# Patient Record
Sex: MALE | Race: WHITE
[De-identification: names, ages, dates, MRNs, and addresses within clinical notes are randomized per-mention and may not be internally consistent; named-entity substitution may affect disease eponyms.]

---

## 2018-07-02 ENCOUNTER — HOSPITAL ENCOUNTER (EMERGENCY)
Dept: HOSPITAL 26 - MED | Age: 53
Discharge: ANOTHER HOSPITAL | End: 2018-07-02
Payer: MEDICAID

## 2018-07-02 VITALS
HEIGHT: 68 IN | SYSTOLIC BLOOD PRESSURE: 126 MMHG | DIASTOLIC BLOOD PRESSURE: 89 MMHG | WEIGHT: 180 LBS | BODY MASS INDEX: 27.28 KG/M2

## 2018-07-02 VITALS — DIASTOLIC BLOOD PRESSURE: 76 MMHG | SYSTOLIC BLOOD PRESSURE: 119 MMHG

## 2018-07-02 DIAGNOSIS — T82.41XA: Primary | ICD-10-CM

## 2018-07-02 DIAGNOSIS — N18.6: ICD-10-CM

## 2018-07-02 DIAGNOSIS — Z99.2: ICD-10-CM

## 2018-07-02 LAB
ALBUMIN FLD-MCNC: 3.7 G/DL (ref 3.4–5)
ANION GAP SERPL CALCULATED.3IONS-SCNC: 13.9 MMOL/L (ref 8–16)
APPEARANCE UR: (no result)
AST SERPL-CCNC: 23 U/L (ref 15–37)
BARBITURATES UR QL SCN: (no result) NG/ML
BASOPHILS # BLD AUTO: 0 K/UL (ref 0–0.22)
BASOPHILS NFR BLD AUTO: 0.6 % (ref 0–2)
BENZODIAZ UR QL SCN: (no result) NG/ML
BILIRUB SERPL-MCNC: 0.3 MG/DL (ref 0–1)
BILIRUB UR QL STRIP: NEGATIVE
BUN SERPL-MCNC: 100 MG/DL (ref 7–18)
BZE UR QL SCN: (no result) NG/ML
CANNABINOIDS UR QL SCN: (no result) NG/ML
CHLORIDE SERPL-SCNC: 91 MMOL/L (ref 98–107)
CHOLEST/HDLC SERPL: 3.3 {RATIO} (ref 1–4.5)
CK MB SERPL-MCNC: 2.6 NG/ML (ref 0–3.6)
CO2 SERPL-SCNC: 29.3 MMOL/L (ref 21–32)
COLOR UR: YELLOW
CREAT SERPL-MCNC: 3.6 MG/DL (ref 0.7–1.3)
EOSINOPHIL # BLD AUTO: 1 K/UL (ref 0–0.4)
EOSINOPHIL NFR BLD AUTO: 11.7 % (ref 0–4)
ERYTHROCYTE [DISTWIDTH] IN BLOOD BY AUTOMATED COUNT: 12.7 % (ref 11.6–13.7)
GFR SERPL CREATININE-BSD FRML MDRD: 23 ML/MIN (ref 90–?)
GLUCOSE SERPL-MCNC: 216 MG/DL (ref 74–106)
GLUCOSE UR STRIP-MCNC: NEGATIVE MG/DL
HCT VFR BLD AUTO: 32.9 % (ref 36–52)
HDLC SERPL-MCNC: 35 MG/DL (ref 40–60)
HGB BLD-MCNC: 11.5 G/DL (ref 12–18)
HGB UR QL STRIP: (no result)
LDLC SERPL CALC-MCNC: 64 MG/DL (ref 60–100)
LEUKOCYTE ESTERASE UR QL STRIP: (no result)
LYMPHOCYTES # BLD AUTO: 2.4 K/UL (ref 2–11.5)
LYMPHOCYTES NFR BLD AUTO: 27.4 % (ref 20.5–51.1)
MAGNESIUM SERPL-MCNC: 2.5 MG/DL (ref 1.8–2.4)
MCH RBC QN AUTO: 29 PG (ref 27–31)
MCHC RBC AUTO-ENTMCNC: 35 G/DL (ref 33–37)
MCV RBC AUTO: 84.1 FL (ref 80–94)
MONOCYTES # BLD AUTO: 0.7 K/UL (ref 0.8–1)
MONOCYTES NFR BLD AUTO: 7.4 % (ref 1.7–9.3)
NEUTROPHILS # BLD AUTO: 4.7 K/UL (ref 1.8–7.7)
NEUTROPHILS NFR BLD AUTO: 52.9 % (ref 42.2–75.2)
NITRITE UR QL STRIP: NEGATIVE
OPIATES UR QL SCN: (no result) NG/ML
PCP UR QL SCN: (no result) NG/ML
PH UR STRIP: 5.5 [PH] (ref 5–9)
PHOSPHATE SERPL-MCNC: 6.3 MG/DL (ref 2.5–4.9)
PLATELET # BLD AUTO: 128 K/UL (ref 140–450)
POTASSIUM SERPL-SCNC: 3.2 MMOL/L (ref 3.5–5.1)
PROTHROMBIN TIME: 10.4 SECS (ref 10.8–13.4)
RBC # BLD AUTO: 3.91 MIL/UL (ref 4.2–6.1)
RBC #/AREA URNS HPF: (no result) /HPF (ref 0–5)
SODIUM SERPL-SCNC: 131 MMOL/L (ref 136–145)
TRIGL SERPL-MCNC: 80 MG/DL (ref 30–150)
TSH SERPL DL<=0.05 MIU/L-ACNC: 1.62 UIU/ML (ref 0.34–3.74)
WBC # BLD AUTO: 8.9 K/UL (ref 4.8–10.8)
WBC,URINE: (no result) /HPF (ref 0–5)

## 2018-07-02 PROCEDURE — 80061 LIPID PANEL: CPT

## 2018-07-02 PROCEDURE — 93005 ELECTROCARDIOGRAM TRACING: CPT

## 2018-07-02 PROCEDURE — 81001 URINALYSIS AUTO W/SCOPE: CPT

## 2018-07-02 PROCEDURE — 84436 ASSAY OF TOTAL THYROXINE: CPT

## 2018-07-02 PROCEDURE — 83880 ASSAY OF NATRIURETIC PEPTIDE: CPT

## 2018-07-02 PROCEDURE — 85025 COMPLETE CBC W/AUTO DIFF WBC: CPT

## 2018-07-02 PROCEDURE — 85610 PROTHROMBIN TIME: CPT

## 2018-07-02 PROCEDURE — 80053 COMPREHEN METABOLIC PANEL: CPT

## 2018-07-02 PROCEDURE — 71045 X-RAY EXAM CHEST 1 VIEW: CPT

## 2018-07-02 PROCEDURE — 84479 ASSAY OF THYROID (T3 OR T4): CPT

## 2018-07-02 PROCEDURE — 84443 ASSAY THYROID STIM HORMONE: CPT

## 2018-07-02 PROCEDURE — 87086 URINE CULTURE/COLONY COUNT: CPT

## 2018-07-02 PROCEDURE — 83735 ASSAY OF MAGNESIUM: CPT

## 2018-07-02 PROCEDURE — 36415 COLL VENOUS BLD VENIPUNCTURE: CPT

## 2018-07-02 PROCEDURE — 84484 ASSAY OF TROPONIN QUANT: CPT

## 2018-07-02 PROCEDURE — 99285 EMERGENCY DEPT VISIT HI MDM: CPT

## 2018-07-02 PROCEDURE — 85730 THROMBOPLASTIN TIME PARTIAL: CPT

## 2018-07-02 PROCEDURE — 82553 CREATINE MB FRACTION: CPT

## 2018-07-02 PROCEDURE — 80305 DRUG TEST PRSMV DIR OPT OBS: CPT

## 2018-07-02 PROCEDURE — 84100 ASSAY OF PHOSPHORUS: CPT

## 2018-07-02 PROCEDURE — 84134 ASSAY OF PREALBUMIN: CPT

## 2018-07-02 PROCEDURE — 83036 HEMOGLOBIN GLYCOSYLATED A1C: CPT

## 2018-07-02 PROCEDURE — 82550 ASSAY OF CK (CPK): CPT

## 2018-07-02 NOTE — NUR
PT RESTING COMFORTABLY IN BED, PT AOX2, FORGETFUL AT TIMES, BEDREST AT THIS 
TIME. PT BIBA FOR DISLODGED L UPPER CHEST DIALYSIS CATH. PT HAS GTUBE, 
COLOSTOMY, SUPRAPUBIC CATH. OPEN WOUNDS NOTED ON BUTTOCKS AND PENILE SHAFT. ALL 
NEEDS MET AT THIS TIME.

## 2018-07-02 NOTE — NUR
Patient to be transferred to [facility].  Is being transferred due 
to DISLODGED LEANN CATH.  Receiving facility has accepting physician and 
available space. ER physician has signed transfer form.  Patient or responsible 
party has agreed to transfer and signed form.  Patient belongings inventoried 
and will be sent with patient.  Copy of nursing notes, lab reports, EKG, 
Physicians Orders and X-rays to be sent with patient.  Report called to PATRICIA, 
RN at receiving facility. [facility] ambulance service has been called for transfer.  
ETA is 1 HR.

## 2018-07-03 LAB
T3RU NFR SERPL: 30 % (ref 24–39)
T4 SERPL-MCNC: 9.6 UG/DL (ref 4.5–12)

## 2018-07-03 NOTE — NUR
PATIENT TRANSFERED TO [facility] URINE CULTURE RETURNED POSITIVE FOR 
YEAST NO SUSCEPTABILITY REPORT AT THIS TIME.

## 2018-07-28 ENCOUNTER — HOSPITAL ENCOUNTER (INPATIENT)
Dept: HOSPITAL 26 - MED | Age: 53
LOS: 1 days | Discharge: NURSING, REHAB OR LONG-TERM CARE | DRG: 203 | End: 2018-07-29
Attending: INTERNAL MEDICINE | Admitting: INTERNAL MEDICINE
Payer: MEDICAID

## 2018-07-28 VITALS — SYSTOLIC BLOOD PRESSURE: 116 MMHG | DIASTOLIC BLOOD PRESSURE: 73 MMHG

## 2018-07-28 VITALS — BODY MASS INDEX: 28.77 KG/M2 | HEIGHT: 66 IN | WEIGHT: 179 LBS

## 2018-07-28 VITALS — DIASTOLIC BLOOD PRESSURE: 70 MMHG | SYSTOLIC BLOOD PRESSURE: 112 MMHG

## 2018-07-28 DIAGNOSIS — E11.22: ICD-10-CM

## 2018-07-28 DIAGNOSIS — Z99.2: ICD-10-CM

## 2018-07-28 DIAGNOSIS — G37.3: ICD-10-CM

## 2018-07-28 DIAGNOSIS — D64.9: ICD-10-CM

## 2018-07-28 DIAGNOSIS — Z93.6: ICD-10-CM

## 2018-07-28 DIAGNOSIS — I12.0: ICD-10-CM

## 2018-07-28 DIAGNOSIS — Z93.3: ICD-10-CM

## 2018-07-28 DIAGNOSIS — Z79.899: ICD-10-CM

## 2018-07-28 DIAGNOSIS — M94.0: Primary | ICD-10-CM

## 2018-07-28 DIAGNOSIS — E78.5: ICD-10-CM

## 2018-07-28 DIAGNOSIS — N18.6: ICD-10-CM

## 2018-07-28 LAB
ALBUMIN FLD-MCNC: 4.6 G/DL (ref 3.4–5)
AMYLASE SERPL-CCNC: 67 U/L (ref 25–115)
ANION GAP SERPL CALCULATED.3IONS-SCNC: 14 MMOL/L (ref 8–16)
AST SERPL-CCNC: 30 U/L (ref 15–37)
BASOPHILS # BLD AUTO: 0.1 K/UL (ref 0–0.22)
BASOPHILS NFR BLD AUTO: 0.7 % (ref 0–2)
BILIRUB SERPL-MCNC: 0.6 MG/DL (ref 0–1)
BUN SERPL-MCNC: 7 MG/DL (ref 7–18)
CHLORIDE SERPL-SCNC: 99 MMOL/L (ref 98–107)
CO2 SERPL-SCNC: 27.1 MMOL/L (ref 21–32)
CREAT SERPL-MCNC: 1.8 MG/DL (ref 0.7–1.3)
EOSINOPHIL # BLD AUTO: 1 K/UL (ref 0–0.4)
EOSINOPHIL NFR BLD AUTO: 13.3 % (ref 0–4)
ERYTHROCYTE [DISTWIDTH] IN BLOOD BY AUTOMATED COUNT: 12.4 % (ref 11.6–13.7)
GFR SERPL CREATININE-BSD FRML MDRD: 51 ML/MIN (ref 90–?)
GLUCOSE SERPL-MCNC: 135 MG/DL (ref 74–106)
HCT VFR BLD AUTO: 34.2 % (ref 36–52)
HGB BLD-MCNC: 11.1 G/DL (ref 12–18)
LIPASE SERPL-CCNC: 132 U/L (ref 73–393)
LYMPHOCYTES # BLD AUTO: 1.5 K/UL (ref 2–11.5)
LYMPHOCYTES NFR BLD AUTO: 19 % (ref 20.5–51.1)
MCH RBC QN AUTO: 28 PG (ref 27–31)
MCHC RBC AUTO-ENTMCNC: 32 G/DL (ref 33–37)
MCV RBC AUTO: 86.1 FL (ref 80–94)
MONOCYTES # BLD AUTO: 0.5 K/UL (ref 0.8–1)
MONOCYTES NFR BLD AUTO: 5.8 % (ref 1.7–9.3)
NEUTROPHILS # BLD AUTO: 4.8 K/UL (ref 1.8–7.7)
NEUTROPHILS NFR BLD AUTO: 61.2 % (ref 42.2–75.2)
PLATELET # BLD AUTO: 150 K/UL (ref 140–450)
POTASSIUM SERPL-SCNC: 3.1 MMOL/L (ref 3.5–5.1)
PROTHROMBIN TIME: 9.2 SECS (ref 10.8–13.4)
RBC # BLD AUTO: 3.97 MIL/UL (ref 4.2–6.1)
SODIUM SERPL-SCNC: 137 MMOL/L (ref 136–145)
WBC # BLD AUTO: 7.9 K/UL (ref 4.8–10.8)

## 2018-07-28 NOTE — NUR
RECEIVED REPORT FROM DAY SHIFT RN FOR CONTINUITY OF CARE. PT IS A/OX1, ON ROOM AIR, Tajik 
SPEAKING ONLY. PT IS ABLE TO MAKE NEEDS KNOWN, ABLE TO FOLLOW COMMANDS. RESPIRATIONS EVEN 
AND UNLABORED AT THE MOMENT. PT HAS DRY AND REDDENED SKIN ON BUTTOCKS/SACRAL AREA, BUT SKIN 
IS INTACT. PT HAS 24G IV TO RIGHT HAND, ASYMPTOMATIC, INTACT AND PATENT. PT HAS A COLOSTOMY 
BAG TO LUQ AND A SUPRAPUBIC CATHETER. OBTAINED MRSA SWAB AND SENT TO LAB. DISCUSSED PLAN OF 
CARE WITH PT, PT VERBALIZED UNDERSTANDING. VITAL SIGNS WITHIN NORMAL LIMITS. PT STABLE, NO 
SIGNS OF DISTRESS NOTED AT THIS TIME. BED IN LOWEST POSITION, BED ALARM ON. CALL LIGHT 
WITHIN REACH, WILL CONTINUE TO MONITOR.

## 2018-07-28 NOTE — NUR
CRITICAL LAB VALUE RECEIVED AT THIS TIME, LACTIC ACID 2.4, ER MD MAGGY ARIAS 
DPRIMARY NURSE NOTIFIED.

## 2018-07-28 NOTE — NUR
Patient will be admitted to care of DR. TANG. Admited to TELE.  Will go to 
rooM 114. Belongings list completed.  Report to POLLY AREVALO.

## 2018-07-28 NOTE — NUR
52 YO M BIBA W/C/O LEFT ARM PAIN RADIATING TO L CHEST. PT AWAKE ALERT AND 
ORIENTED . EMS STATES THAT PT WAS 2 HRS IN TO DIALYSIS, WHEN PAIN STARTED, PT 
WANTED TO STOP DIALYSIS. PT RESEVED 24ASA IN ROUTE. PT DENIES ANY SOB, PT WITH 
N/V ON ARRIVAL. 

INROUTE. ELMER CATH TO R UPPER CHEST, SUPRAPUBIC FOLY, COLOSTOMY. LS CLEAR 
THROUGHOUT, BS ACTIVE. WILL CONTINUE TO MONITOR 



HX OF HTN, DM. DIALYSIS TUS,THU,SAT

## 2018-07-29 VITALS — DIASTOLIC BLOOD PRESSURE: 59 MMHG | SYSTOLIC BLOOD PRESSURE: 91 MMHG

## 2018-07-29 VITALS — DIASTOLIC BLOOD PRESSURE: 71 MMHG | SYSTOLIC BLOOD PRESSURE: 106 MMHG

## 2018-07-29 VITALS — DIASTOLIC BLOOD PRESSURE: 72 MMHG | SYSTOLIC BLOOD PRESSURE: 109 MMHG

## 2018-07-29 VITALS — SYSTOLIC BLOOD PRESSURE: 104 MMHG | DIASTOLIC BLOOD PRESSURE: 69 MMHG

## 2018-07-29 LAB
ALBUMIN FLD-MCNC: 3.7 G/DL (ref 3.4–5)
ANION GAP SERPL CALCULATED.3IONS-SCNC: 13 MMOL/L (ref 8–16)
APPEARANCE UR: (no result)
AST SERPL-CCNC: 22 U/L (ref 15–37)
BASOPHILS # BLD AUTO: 0.1 K/UL (ref 0–0.22)
BASOPHILS NFR BLD AUTO: 0.7 % (ref 0–2)
BILIRUB SERPL-MCNC: 0.4 MG/DL (ref 0–1)
BILIRUB UR QL STRIP: NEGATIVE
BUN SERPL-MCNC: 12 MG/DL (ref 7–18)
CHLORIDE SERPL-SCNC: 100 MMOL/L (ref 98–107)
CK MB SERPL-MCNC: 10.1 NG/ML (ref 0–3.6)
CO2 SERPL-SCNC: 28.1 MMOL/L (ref 21–32)
COLOR UR: YELLOW
CREAT SERPL-MCNC: 2.2 MG/DL (ref 0.7–1.3)
EOSINOPHIL # BLD AUTO: 1.5 K/UL (ref 0–0.4)
EOSINOPHIL NFR BLD AUTO: 20.6 % (ref 0–4)
ERYTHROCYTE [DISTWIDTH] IN BLOOD BY AUTOMATED COUNT: 12.3 % (ref 11.6–13.7)
GFR SERPL CREATININE-BSD FRML MDRD: 41 ML/MIN (ref 90–?)
GLUCOSE SERPL-MCNC: 137 MG/DL (ref 74–106)
GLUCOSE UR STRIP-MCNC: NEGATIVE MG/DL
HCT VFR BLD AUTO: 29 % (ref 36–52)
HGB BLD-MCNC: 9.8 G/DL (ref 12–18)
HGB UR QL STRIP: (no result)
LEUKOCYTE ESTERASE UR QL STRIP: (no result)
LYMPHOCYTES # BLD AUTO: 2.1 K/UL (ref 2–11.5)
LYMPHOCYTES NFR BLD AUTO: 30 % (ref 20.5–51.1)
MAGNESIUM SERPL-MCNC: 1.8 MG/DL (ref 1.8–2.4)
MCH RBC QN AUTO: 29 PG (ref 27–31)
MCHC RBC AUTO-ENTMCNC: 34 G/DL (ref 33–37)
MCV RBC AUTO: 85.3 FL (ref 80–94)
MONOCYTES # BLD AUTO: 0.6 K/UL (ref 0.8–1)
MONOCYTES NFR BLD AUTO: 8.1 % (ref 1.7–9.3)
NEUTROPHILS # BLD AUTO: 2.9 K/UL (ref 1.8–7.7)
NEUTROPHILS NFR BLD AUTO: 40.6 % (ref 42.2–75.2)
NITRITE UR QL STRIP: NEGATIVE
PH UR STRIP: 6.5 [PH] (ref 5–9)
PHOSPHATE SERPL-MCNC: 2.8 MG/DL (ref 2.5–4.9)
PLATELET # BLD AUTO: 127 K/UL (ref 140–450)
POTASSIUM SERPL-SCNC: 3.1 MMOL/L (ref 3.5–5.1)
RBC # BLD AUTO: 3.4 MIL/UL (ref 4.2–6.1)
RBC #/AREA URNS HPF: (no result) /HPF (ref 0–5)
SODIUM SERPL-SCNC: 138 MMOL/L (ref 136–145)
WBC # BLD AUTO: 7.1 K/UL (ref 4.8–10.8)
WBC,URINE: (no result) /HPF (ref 0–5)

## 2018-07-29 NOTE — NUR
PT STATED THAT HE LOST HIS PAIR OF GLASSES AND CELLPHONE. CHECKED PT'S ROOM, FOUND PT'S 
GLASSES UNDERNEATH THE SHEET, PT'S CELL PHONE WAS FOUND IN THE NURSE'S STATION BEING 
CHARGED. GAVE PT HIS GLASSES AND CELLPHONE, INSTRUCTED TO KEEP IT WITHIN HIM. PT VERBALIZED 
UNDERSTANDING.

## 2018-07-29 NOTE — NUR
CALLED [facility] AND GAVE ME THE LOGISTIC TRANSPORTATION [phone]. SPOKE TO [facility] AND 
GAVE RESERVATION# [id number]. JORDY AREVALO MADE AWARE.

## 2018-07-29 NOTE — NUR
PT WHEELED OUT TO THE PARKING LOT AND HELPED TRANSFERRED TO THE TRANSPORT VEHICLE. PT IS D/C 
BACK TO [facility] IN STABLE CONDITION. NO COMPLAINTS MADE, ALL BELONGINGS 
WITH HIM, GLASSES, CELLPHONE AND WHEELCHAIR.

## 2018-07-29 NOTE — NUR
RECEIVED PT ON BED, AAO WITH PERIODS OF CONFUSION, REORIENTATION PROVIDED. NO SOB NOTED. NO 
C/O PAIN AT THIS TIME. IV TO LT HAND PATENT AND INTACT. CHEST CLEAR. ABDOMEN SOFT, BOWEL 
SOUNDS PRESENT, WITH COLOSTOMY TO LT LOWER QUADRANT, NEW BAG APPLIED BY NIGHTSHIFT, NO 
STOOLS NOTED YET. WITH HANSON DRAINING MODERATE AMOUNTS OF CLOUDY, ROSA URINE. INSTRUCTED PT 
TO CALL FOR ASSISTANCE, CALL LIGHT WITHIN REACH, PT VERBALIZED PARTIAL UNDERSTANDING.

## 2018-07-29 NOTE — NUR
REPORT GIVEN TO HELEN AT [facility] WHERE PT CAME FROM ([phone]), PT 
IS GOING BACK TO ROOM 602-BED B. VOICEMAIL MESSAGE LEFT TO PT'S SON HUSAM EDWARDS 
([phone]) REGARDING THE DISCHARGE. DRAINED 380 MLS OF SLIGHTLY CLOUDY URINE FROM 
SUPRAPUBIC CATHETER. NO OUTPUT NOTED ON COLOSTOMY, BAG CLEAN AND INTACT. RT CHEST HD 
DIALYSIS CATHETER IN PLACE, SITE CLEAN AND DRY. DISCHARGE INSTRUCTIONS GIVEN TO PT, 
VERBALIZED UNDERSTANDING. ARM BANDS AND IV REMOVED, CANNULA TIP INTACT.

## 2018-07-31 NOTE — NUR
RETRO   FAXED ER REPORT, H&P AND DISCHARGE INSTRUCTIONS TO [facility] [phone]  AND 
[place] [phone]

## 2018-08-01 ENCOUNTER — HOSPITAL ENCOUNTER (INPATIENT)
Dept: HOSPITAL 26 - MED | Age: 53
LOS: 2 days | Discharge: NURSING, REHAB OR LONG-TERM CARE | DRG: 351 | End: 2018-08-03
Admitting: FAMILY MEDICINE
Payer: MEDICAID

## 2018-08-01 VITALS — BODY MASS INDEX: 25.62 KG/M2 | HEIGHT: 68 IN | WEIGHT: 169.06 LBS

## 2018-08-01 VITALS — SYSTOLIC BLOOD PRESSURE: 111 MMHG | DIASTOLIC BLOOD PRESSURE: 76 MMHG

## 2018-08-01 VITALS — DIASTOLIC BLOOD PRESSURE: 69 MMHG | SYSTOLIC BLOOD PRESSURE: 111 MMHG

## 2018-08-01 VITALS — SYSTOLIC BLOOD PRESSURE: 93 MMHG | DIASTOLIC BLOOD PRESSURE: 62 MMHG

## 2018-08-01 VITALS — SYSTOLIC BLOOD PRESSURE: 113 MMHG | DIASTOLIC BLOOD PRESSURE: 79 MMHG

## 2018-08-01 DIAGNOSIS — E83.52: ICD-10-CM

## 2018-08-01 DIAGNOSIS — Z79.899: ICD-10-CM

## 2018-08-01 DIAGNOSIS — Z93.3: ICD-10-CM

## 2018-08-01 DIAGNOSIS — E11.22: ICD-10-CM

## 2018-08-01 DIAGNOSIS — E11.65: ICD-10-CM

## 2018-08-01 DIAGNOSIS — Z79.4: ICD-10-CM

## 2018-08-01 DIAGNOSIS — M19.019: Primary | ICD-10-CM

## 2018-08-01 DIAGNOSIS — G37.3: ICD-10-CM

## 2018-08-01 DIAGNOSIS — R07.9: ICD-10-CM

## 2018-08-01 DIAGNOSIS — N18.6: ICD-10-CM

## 2018-08-01 DIAGNOSIS — D64.9: ICD-10-CM

## 2018-08-01 DIAGNOSIS — E87.6: ICD-10-CM

## 2018-08-01 DIAGNOSIS — I12.0: ICD-10-CM

## 2018-08-01 DIAGNOSIS — I25.10: ICD-10-CM

## 2018-08-01 DIAGNOSIS — Z99.2: ICD-10-CM

## 2018-08-01 DIAGNOSIS — E78.5: ICD-10-CM

## 2018-08-01 LAB
ALBUMIN FLD-MCNC: 3.9 G/DL (ref 3.4–5)
ANION GAP SERPL CALCULATED.3IONS-SCNC: 11.3 MMOL/L (ref 8–16)
APPEARANCE UR: (no result)
AST SERPL-CCNC: 26 U/L (ref 15–37)
BASOPHILS # BLD AUTO: 0 K/UL (ref 0–0.22)
BASOPHILS NFR BLD AUTO: 0.7 % (ref 0–2)
BILIRUB SERPL-MCNC: 0.5 MG/DL (ref 0–1)
BILIRUB UR QL STRIP: NEGATIVE
BUN SERPL-MCNC: 21 MG/DL (ref 7–18)
CHLORIDE SERPL-SCNC: 100 MMOL/L (ref 98–107)
CO2 SERPL-SCNC: 28.8 MMOL/L (ref 21–32)
COLOR UR: YELLOW
CREAT SERPL-MCNC: 2.9 MG/DL (ref 0.7–1.3)
EOSINOPHIL # BLD AUTO: 1.2 K/UL (ref 0–0.4)
EOSINOPHIL NFR BLD AUTO: 15.6 % (ref 0–4)
ERYTHROCYTE [DISTWIDTH] IN BLOOD BY AUTOMATED COUNT: 12.5 % (ref 11.6–13.7)
GFR SERPL CREATININE-BSD FRML MDRD: 30 ML/MIN (ref 90–?)
GLUCOSE SERPL-MCNC: 169 MG/DL (ref 74–106)
GLUCOSE UR STRIP-MCNC: NEGATIVE MG/DL
HCT VFR BLD AUTO: 31.4 % (ref 36–52)
HGB BLD-MCNC: 10.5 G/DL (ref 12–18)
HGB UR QL STRIP: (no result)
LEUKOCYTE ESTERASE UR QL STRIP: (no result)
LYMPHOCYTES # BLD AUTO: 1.5 K/UL (ref 2–11.5)
LYMPHOCYTES NFR BLD AUTO: 20.5 % (ref 20.5–51.1)
MCH RBC QN AUTO: 28 PG (ref 27–31)
MCHC RBC AUTO-ENTMCNC: 33 G/DL (ref 33–37)
MCV RBC AUTO: 85.3 FL (ref 80–94)
MONOCYTES # BLD AUTO: 0.5 K/UL (ref 0.8–1)
MONOCYTES NFR BLD AUTO: 6.8 % (ref 1.7–9.3)
NEUTROPHILS # BLD AUTO: 4.3 K/UL (ref 1.8–7.7)
NEUTROPHILS NFR BLD AUTO: 56.4 % (ref 42.2–75.2)
NITRITE UR QL STRIP: NEGATIVE
PH UR STRIP: 7 [PH] (ref 5–9)
PLATELET # BLD AUTO: 146 K/UL (ref 140–450)
POTASSIUM SERPL-SCNC: 3.1 MMOL/L (ref 3.5–5.1)
PROTHROMBIN TIME: 9.9 SECS (ref 10.8–13.4)
RBC # BLD AUTO: 3.68 MIL/UL (ref 4.2–6.1)
RBC #/AREA URNS HPF: (no result) /HPF (ref 0–5)
SODIUM SERPL-SCNC: 137 MMOL/L (ref 136–145)
WBC # BLD AUTO: 7.6 K/UL (ref 4.8–10.8)
WBC,URINE: (no result) /HPF (ref 0–5)

## 2018-08-01 NOTE — NUR
PATIENT IS SLEEPING, NO S/S OF DISTRESS NOTED, RESPIRATION EVEN AND UNLABORED, CALL LIGHT 
WITHIN REACH, SAFETY MEASURE ENSURED, WILL CONTINUE TO MONITOR.

## 2018-08-01 NOTE — NUR
PATIENT BIBA WITH COMPLAINTS OF CHEST PAIN X 4 MONTHS. PER [facility] PATIENT HAS A HX 
OF CALLING 911 FOR NON-EMERGENCY MATTER. HX OF MENTAL HEALTH ISSUES. PATIENT 
REPORTS CHEST PAIN X 4 MONTHS 10/10. DENIES N/V/D; SKIN AT RIGHT KNEE APPEARS 
TO BE OPEN WOUND, PICTURES TAKEN; LUNGS CLEAR BL; HR EVEN AND REGULAR; PT 
DENIES ANY FEVER, SOB, OR COUGH AT THIS TIME; VSS; PATIENT POSITIONED FOR 
COMFORT; HOB ELEVATED; BEDRAILS UP X2; BED DOWN. ER MD MADE AWARE OF PT STATUS.

## 2018-08-01 NOTE — NUR
RECEIVED REPORT FROM DAY SHIFT RN, PATIENT RESTING IN BED, NO S/S OF DISTRESS NOTED, 
RESPIRATION EVEN AND UNLABORED, O2SAT 97% VIA NASAL CANNULAR 2L/MIN. DIALYSIS ACCESS NOTED 
TO THE RIGHT UPPER CHEST, CLEAN AND DRY. IV TO THE RIGHT HAND PATENT AND INTACT, SALINE 
LOCK. SUPRAPUBIC CATHETER IN PLACE, DRAINING URINE BY GRAVITY, COLOSTOMY BAG IN PLACE, DRY 
AND CLEAN. CALL LIGHT WITHIN REACH, SAFETY MEASURE ENSURED, WILL CONTINUE TO MONITOR.

## 2018-08-01 NOTE — NUR
RECEIVED PT ON UNIT VIA [employer] PT. PT IS AAOX2, BEDBOUND, PT HAS IV ON HIS LEFT HAND, 
PATENT, INTACT, FLUSHING WELL, PT IS ON O2 2L NC, PT HAS A SUPRAPUBIC CATHETER, COLOSTOMY 
BAG ON LEFT QUADRANT, PT HAS SKIN TEAR ON LEFT SHIN, PT HAS DRYNESS ON HIS BUTTOCKS, NO S/S 
OF RESPIRATORY DISTRESS OR DISCOMFORT NOTED, CALL LIGHT WITHIN REACH, WILL CONTINUE TO 
MONITOR.

## 2018-08-02 VITALS — DIASTOLIC BLOOD PRESSURE: 61 MMHG | SYSTOLIC BLOOD PRESSURE: 117 MMHG

## 2018-08-02 VITALS — DIASTOLIC BLOOD PRESSURE: 60 MMHG | SYSTOLIC BLOOD PRESSURE: 107 MMHG

## 2018-08-02 VITALS — SYSTOLIC BLOOD PRESSURE: 95 MMHG | DIASTOLIC BLOOD PRESSURE: 62 MMHG

## 2018-08-02 VITALS — SYSTOLIC BLOOD PRESSURE: 100 MMHG | DIASTOLIC BLOOD PRESSURE: 70 MMHG

## 2018-08-02 VITALS — SYSTOLIC BLOOD PRESSURE: 128 MMHG | DIASTOLIC BLOOD PRESSURE: 60 MMHG

## 2018-08-02 VITALS — DIASTOLIC BLOOD PRESSURE: 59 MMHG | SYSTOLIC BLOOD PRESSURE: 93 MMHG

## 2018-08-02 LAB
BASOPHILS # BLD AUTO: 0.1 K/UL (ref 0–0.22)
BASOPHILS NFR BLD AUTO: 0.8 % (ref 0–2)
EOSINOPHIL # BLD AUTO: 1.2 K/UL (ref 0–0.4)
EOSINOPHIL NFR BLD AUTO: 17.2 % (ref 0–4)
ERYTHROCYTE [DISTWIDTH] IN BLOOD BY AUTOMATED COUNT: 12.4 % (ref 11.6–13.7)
HCT VFR BLD AUTO: 29.7 % (ref 36–52)
HGB BLD-MCNC: 10 G/DL (ref 12–18)
LYMPHOCYTES # BLD AUTO: 1.9 K/UL (ref 2–11.5)
LYMPHOCYTES NFR BLD AUTO: 27.7 % (ref 20.5–51.1)
MCH RBC QN AUTO: 29 PG (ref 27–31)
MCHC RBC AUTO-ENTMCNC: 34 G/DL (ref 33–37)
MCV RBC AUTO: 84.9 FL (ref 80–94)
MONOCYTES # BLD AUTO: 0.5 K/UL (ref 0.8–1)
MONOCYTES NFR BLD AUTO: 7.9 % (ref 1.7–9.3)
NEUTROPHILS # BLD AUTO: 3.1 K/UL (ref 1.8–7.7)
NEUTROPHILS NFR BLD AUTO: 46.4 % (ref 42.2–75.2)
PLATELET # BLD AUTO: 140 K/UL (ref 140–450)
RBC # BLD AUTO: 3.5 MIL/UL (ref 4.2–6.1)
WBC # BLD AUTO: 6.8 K/UL (ref 4.8–10.8)

## 2018-08-02 PROCEDURE — 5A1D70Z PERFORMANCE OF URINARY FILTRATION, INTERMITTENT, LESS THAN 6 HOURS PER DAY: ICD-10-PCS | Performed by: FAMILY MEDICINE

## 2018-08-02 RX ADMIN — ENOXAPARIN SODIUM SCH MG: 40 INJECTION SUBCUTANEOUS at 08:16

## 2018-08-02 NOTE — NUR
PATIENT IS SLEEPING, NO S/S OF DISTRESS NOTED, RESPIRATION EVEN AND UNLABORED. CALL LIGHT 
WITHIN REACH, SAFETY MEASURE ENSURED, WILL CONTINUE TO MONITOR.

## 2018-08-02 NOTE — NUR
PATIENT STATED," I DON'T LIKE THE COLOR OF MY GOWN." EDUCATED PATIENT THE YELLOW GOWN IS 
PART OF FALL RISK PROTOCOL, BUT PATIENT SAID," I DON'T LIKE IT, I WANT THE BLUE ONE." 
PUTTING BLUE GOWN ON AND PATIENT RESTING IN BED, VITAL SIGNS STABLE. CALL LIGHT WITHIN 
REACH, SAFETY MEASURE ENSURED, WILL CONTINUE TO MONITOR.

## 2018-08-02 NOTE — NUR
PT RESTING IN BED. NO S/S OF ACUTE DISTRESS. PT DENIES PAIN AT THIS TIME. CALL LIGHT WITHIN 
REACH SAFETY MEASURES ENSURED. WILL CONTINUE TO MONITOR.

## 2018-08-02 NOTE — NUR
RECEIVED A PHONE CALL FROM THE FIRE DEPARTMENT STATING THE PATIENT WAS TRYING TO ARRANGE 
TRANSPORTATION. WENT TO PT'S ROOM. PT AAXO2, CONFUSED AND NOT SPEAKING FULL THOUGHTS. PT 
SPEAKING MOSTLY IN Guyanese. SOFÍA FREEMAN  HELPED TO TRANSLATE. PT STATES HE DOESN'T HAVE A 
ROOM, THAT HE HAS DIALYSIS AND NEEDS TRANSPORT. SOFÍA TRIED TO REORIENT PT AND EXPLAIN 
THAT HE WOULD BE GETTING DIALYSIS TODAY IN THE ROOM HE WAS IN. PT UNABLE TO COMPREHEND AT 
THIS TIME. PER SOFAÍ PT IS CONFUSED AND NOT MAKING SENSE WHEN SPEAKING.

## 2018-08-02 NOTE — NUR
PATIENT HAS BEEN SCREENED AND CATEGORIZED AS MODERATE NUTRITION RISK. PATIENT WILL BE SEEN 
WITHIN 3-5 DAYS OF ADMISSION.



8/4/18 -8/6/18



CORINA MARRERO RD

## 2018-08-02 NOTE — NUR
NO CHANGE IN CONDITION, PATIENT IS SLEEPING, NO S/S OF DISTRESS NOTED, CALL LIGHT WITHIN 
REACH, SAFETY MEASURE ENSURED, WILL CONTINUE TO MONITOR.

## 2018-08-02 NOTE — NUR
REPOSITIONED PATIENT TO THE RIGHT, BUT PATIENT ASKED ME TO REMOVE ALL THE PILLOWS, AND 
SAID," I DON'T WANT PILLOWS NOW, TAKE THOSE PILLOWS OUT." EDUCATED PATIENT THE IMPORTANCE OF 
CHANGING POSITION, BUT PATIENT STILL REFUSED TO BE POSITIONED ON HIS LEFT SIDE OR RIGHT 
SIDE. PATIENT ALSO ASKED ME WHERE HIS NURSE WAS, INFORMED THE PATIENT THAT I AM HIS NURSE, 
BUT PATIENT BECAME UPSET AND SAID," YOU WERE NOT MY NURSE." EXPLAINED TO THE PATIENT I 
HELPED HIM WITH THE CUPS AND TABLE, AND PUT BLUE GOWN FOR HIM EARLIER, PATIENT SAID," OKAY." 
CALL LIGHT WITHIN REACH, SAFETY MEASURE ENSURED, WILL CONTINUE TO MONITOR.

## 2018-08-02 NOTE — NUR
RECEIVED REPORT FROM NIGHT RN AT BEDSIDE. PT SLEEPING IN BED. AAOX2. NO S/S OF ACUTE 
DISTRESS. PT DENIES PAIN. IV SITE PATENT AND INTACT. PLAN OF CARE DISCUSSED. CALL LIGHT 
WITHIN REACH. SAFETY MEASURES ENSURED. WILL CONTINUE TO MONITOR.

## 2018-08-02 NOTE — NUR
DIALYSIS RN AT BEDSIDE. NO S/S OF ACUTE DISTRESS. PT DENIES PAIN. CALL LIGHT WITHIN REACH. 
SAFETY MEASURES ENSURED. WILL CONTINUE TO MONITOR.

## 2018-08-02 NOTE — NUR
PENDING 15MINS IN THE ROOM MOVING CUPS AND TABLE TO SPECIFIC AREA AS PATIENT REQUESTED. 
PATIENT RESTING IN BED, NO S/S OF DISTRESS NOTED, CALL LIGHT WITHIN REACH, SAFETY MEASURE 
ENSURED, WILL CONTINUE TO MONITOR.

## 2018-08-02 NOTE — NUR
RECEIVED REPORT FROM DAY SHIFT RN, PATIENT WAS SLEEPING, BUT EASY TO AROUSE, ORIENTED X2, NO 
S/S OF DISTRESS NOTED, RESPIRATION EVEN AND UNLABORED, O2SAT 97% ON ROOM AIR. DIALYSIS 
ACCESS NOTED TO THE RIGHT UPPER CHEST, CLEAN AND DRY. IV TO THE RIGHT HAND PATENT AND 
INTACT, SALINE LOCK. SUPRAPUBIC CATHETER IN PLACE, DRAINING URINE BY GRAVITY, COLOSTOMY BAG 
IN PLACE, DRY AND CLEAN. CALL LIGHT WITHIN REACH, SAFETY MEASURE ENSURED, WILL CONTINUE TO 
MONITOR.

## 2018-08-03 VITALS — SYSTOLIC BLOOD PRESSURE: 111 MMHG | DIASTOLIC BLOOD PRESSURE: 72 MMHG

## 2018-08-03 VITALS — DIASTOLIC BLOOD PRESSURE: 71 MMHG | SYSTOLIC BLOOD PRESSURE: 100 MMHG

## 2018-08-03 VITALS — SYSTOLIC BLOOD PRESSURE: 106 MMHG | DIASTOLIC BLOOD PRESSURE: 63 MMHG

## 2018-08-03 VITALS — SYSTOLIC BLOOD PRESSURE: 111 MMHG | DIASTOLIC BLOOD PRESSURE: 73 MMHG

## 2018-08-03 RX ADMIN — ENOXAPARIN SODIUM SCH MG: 40 INJECTION SUBCUTANEOUS at 08:23

## 2018-08-03 NOTE — NUR
RECEIVED PT FROM NIGHT SHIFT NURSE, PT IS AAOX3, PT IS SLEEPING IN BED BUT EASILY AROUSED, 
PT IS IN SEMI FOWLERS POSITION, PT HAS IV ON HIS LEFT HAND, PATENT, INTACT, FLUSHING WELL, 
PT HAS LEFT SHIN SKIN TEAR, SACRAL DRYNESS, PT HAS HEMODIALYSIS ACCESS ON RIGHT UPPER CHEST, 
NO S/S OF RESPIRATORY DISTRESS OR DISCOMFORT NOTED, DISCUSSED PLAN OF CARE WITH PT, PT 
REINFORCEMENT NEEDED, SAFETY/FALL PRECAUTIONS ARE IN PLACE, CALL LIGHT IS WITHIN REACH, WILL 
CONTINUE TO MONITOR.

## 2018-08-03 NOTE — NUR
CALLED [facility] [phone] AND SET UP WC TRANSPORT.   I CALLED BACK AND SPOKE WITH 
KAIDEN.   HE SAID PICKUP WILL BE AT 4P.M.     RESERVATION #[id number].

## 2018-08-03 NOTE — NUR
CALLED SCOT AREVALO    THE PATIENT WILL GO TO ROOM 602B UNDER DR. TROTTER AT [facility]. 
  THE PICKUP WILL BE SukhjinderPBRAULIO ALEGRIA RN AWARE.

## 2018-08-03 NOTE — NUR
CALLED [facility], SINCE PATIENT IF OUT OF AREA, REVIEWS GO TO [facility].

I CALLED [facility] AND SPOKE WITH ALONG, [phone].   SHE SAID TO FAX REVIEWS TO [facility] [phone].  

FAXED 8/2/18 AND [phone] REVIEWS WITH ER REPORT AND H&P TO [facility].

## 2018-08-03 NOTE — NUR
Note:



[facility] Per Shalonda from [facility] [phone], patient can go to room 602B after 3pm today, accepting physician is 
,  Phoebe gifford.

## 2018-08-03 NOTE — NUR
VITAL SIGNS STABLE, NO S/S OF DISTRESS NOTED, RESPIRATION EVEN AND UNLABORED, REFUSED TO BE 
POSITIONED ON HIS SIDE, EDUCATED PATIENT THE IMPORTANCE OF CHANGING POSITION, BUT PATIENT 
STATED," NO, I AM OKAY NOW." CALL LIGHT WITHIN REACH, SAFETY MEASURE ENSURED, WILL CONTINUE 
TO MONITOR.

## 2018-08-03 NOTE — NUR
PT DISCHARGE INSTRUCTIONS GIVEN, IV REMOVED, CATHETER TIP INTACT, ID WRIST BAND REMOVED. PT 
STABLE UPON DISCHARGE, PICKED UP BY [facility].

## 2018-08-03 NOTE — NUR
PATIENT WAS SLEEPING, EASY TO AROUSE, NO S/S OF DISTRESS NOTED, CALL LIGHT WITHIN REACH, 
SAFETY MEASURE ENSURED, WILL CONTINUE TO MONITOR.

## 2018-08-03 NOTE — NUR
VITAL SIGNS STABLE, NO S/S OF DISTRESS NOTED, RESPIRATION EVEN AND UNLABORED, CALL LIGHT 
WITHIN REACH, SAFETY MEASURE ENSURED, WILL CONTINUE TO MONITOR.

## 2018-08-03 NOTE — NUR
PATIENT WAS SLEEPING, EASY TO AROUSE, REFUSED TO BE REPOSITIONED, CALL LIGHT WITHIN REACH, 
SAFETY MEASURE ENSURED, WILL CONTINUE TO MONITOR.

## 2018-09-20 ENCOUNTER — HOSPITAL ENCOUNTER (EMERGENCY)
Dept: HOSPITAL 26 - MED | Age: 53
LOS: 1 days | Discharge: HOME | End: 2018-09-21
Payer: MEDICAID

## 2018-09-20 VITALS — DIASTOLIC BLOOD PRESSURE: 66 MMHG | SYSTOLIC BLOOD PRESSURE: 103 MMHG

## 2018-09-20 VITALS — WEIGHT: 180 LBS | HEIGHT: 66 IN | BODY MASS INDEX: 28.93 KG/M2

## 2018-09-20 DIAGNOSIS — Z79.4: ICD-10-CM

## 2018-09-20 DIAGNOSIS — N18.6: ICD-10-CM

## 2018-09-20 DIAGNOSIS — I82.B12: Primary | ICD-10-CM

## 2018-09-20 DIAGNOSIS — Z99.2: ICD-10-CM

## 2018-09-20 DIAGNOSIS — E11.22: ICD-10-CM

## 2018-09-20 DIAGNOSIS — Z79.899: ICD-10-CM

## 2018-09-20 DIAGNOSIS — I12.0: ICD-10-CM

## 2018-09-20 LAB
ALBUMIN FLD-MCNC: 3.2 G/DL (ref 3.4–5)
ANION GAP SERPL CALCULATED.3IONS-SCNC: 7.7 MMOL/L (ref 8–16)
AST SERPL-CCNC: 39 U/L (ref 15–37)
BASOPHILS NFR BLD AUTO: 1.3 % (ref 0–2)
BILIRUB SERPL-MCNC: 0.5 MG/DL (ref 0–1)
BUN SERPL-MCNC: 6 MG/DL (ref 7–18)
CHLORIDE SERPL-SCNC: 102 MMOL/L (ref 98–107)
CO2 SERPL-SCNC: 32.1 MMOL/L (ref 21–32)
CREAT SERPL-MCNC: 1.7 MG/DL (ref 0.7–1.3)
EOSINOPHIL NFR BLD AUTO: 15.9 % (ref 0–4)
ERYTHROCYTE [DISTWIDTH] IN BLOOD BY AUTOMATED COUNT: 15 % (ref 11.6–13.7)
GFR SERPL CREATININE-BSD FRML MDRD: 55 ML/MIN (ref 90–?)
GLUCOSE SERPL-MCNC: 98 MG/DL (ref 74–106)
HCT VFR BLD AUTO: 39.5 % (ref 36–52)
HGB BLD-MCNC: 12.7 G/DL (ref 12–18)
LYMPHOCYTES NFR BLD AUTO: 27.7 % (ref 20.5–51.1)
MCH RBC QN AUTO: 29 PG (ref 27–31)
MCHC RBC AUTO-ENTMCNC: 32 G/DL (ref 33–37)
MCV RBC AUTO: 88.9 FL (ref 80–94)
MONOCYTES NFR BLD AUTO: 8 % (ref 1.7–9.3)
NEUTROPHILS NFR BLD AUTO: 47.1 % (ref 42.2–75.2)
PLATELET # BLD AUTO: 139 K/UL (ref 140–450)
POTASSIUM SERPL-SCNC: 2.8 MMOL/L (ref 3.5–5.1)
PROTHROMBIN TIME: 9.9 SECS (ref 10.8–13.4)
RBC # BLD AUTO: 4.44 MIL/UL (ref 4.2–6.1)
SODIUM SERPL-SCNC: 139 MMOL/L (ref 136–145)
WBC # BLD AUTO: 5.3 K/UL (ref 4.8–10.8)

## 2018-09-20 PROCEDURE — 93971 EXTREMITY STUDY: CPT

## 2018-09-20 PROCEDURE — 99285 EMERGENCY DEPT VISIT HI MDM: CPT

## 2018-09-20 PROCEDURE — 80053 COMPREHEN METABOLIC PANEL: CPT

## 2018-09-20 PROCEDURE — 36415 COLL VENOUS BLD VENIPUNCTURE: CPT

## 2018-09-20 PROCEDURE — 85025 COMPLETE CBC W/AUTO DIFF WBC: CPT

## 2018-09-20 PROCEDURE — 71045 X-RAY EXAM CHEST 1 VIEW: CPT

## 2018-09-20 PROCEDURE — 85730 THROMBOPLASTIN TIME PARTIAL: CPT

## 2018-09-20 PROCEDURE — 93005 ELECTROCARDIOGRAM TRACING: CPT

## 2018-09-20 PROCEDURE — 83880 ASSAY OF NATRIURETIC PEPTIDE: CPT

## 2018-09-20 PROCEDURE — 84484 ASSAY OF TROPONIN QUANT: CPT

## 2018-09-20 PROCEDURE — 85610 PROTHROMBIN TIME: CPT

## 2018-09-20 NOTE — NUR
PT. BIB AMBULANCE FROM [facility] DUE TO ABNORMAL ULTRASOUND. ACCORDING TO 
REPORT FROM FACILITY " PT. HAS BLOOD CLOT ON L SUBCLAVIAN VEIN". PT IS AAOX3 , 
R DIALYSIS SHUNT NOTED TO R SIDE OF CHEST. RR EVEN AND UNLABORED. HANSON CATH 
PRESENT UPON ARRIVAL FROM FACILITY. COLOSTOMY BAG PRESENT WITH GREEN STOOL 
NOTED. PT HAS L KNEE BRUISE. PT. HAS 7/10 ACHING PAIN ALL OVER ONGOING SINCE 
"STROKE A YEAR AGO " PER PATIENT. DENIES N/V/D. L ARM SWELLING 2+ NOTED , NON 
PITTING. ER MD NOTIFIED. VSS. BED IN LOWEST POSITION. WILL CONTINUE TO MONITOR.

## 2018-09-20 NOTE — NUR
-------------------------------------------------------------------------------

            *** Note blayneone in EDM - 09/20/18 at 2332 by MED ***             

-------------------------------------------------------------------------------

Patient discharged with v/s stable. Written and verbal after care instructions 
given and explained. 

Patient alert, oriented and verbalized understanding of instructions. 
Ambulatory with steady gait. All questions addressed prior to discharge. ID 
band removed. Patient advised to follow up with PMD. Rx of Motrin and Zofran 
given. Patient educated on indication of medication including possible reaction 
and side effects. Opportunity to ask questions provided and answered.

## 2018-09-20 NOTE — NUR
-------------------------------------------------------------------------------

            *** Note undone in [facility] - 09/20/18 at 1806 by MEDHT ***             

-------------------------------------------------------------------------------

PT BIBA BLS TO BED 8

## 2018-09-21 VITALS — DIASTOLIC BLOOD PRESSURE: 75 MMHG | SYSTOLIC BLOOD PRESSURE: 119 MMHG

## 2018-09-22 ENCOUNTER — HOSPITAL ENCOUNTER (INPATIENT)
Dept: HOSPITAL 1 - ED | Age: 53
LOS: 2 days | Discharge: NURSING, REHAB OR LONG-TERM CARE | DRG: 197 | End: 2018-09-24
Attending: INTERNAL MEDICINE | Admitting: INTERNAL MEDICINE
Payer: MEDICAID

## 2018-09-22 VITALS
BODY MASS INDEX: 25.92 KG/M2 | WEIGHT: 171.06 LBS | HEIGHT: 68 IN | BODY MASS INDEX: 25.92 KG/M2 | HEIGHT: 68 IN | WEIGHT: 171.06 LBS

## 2018-09-22 VITALS — SYSTOLIC BLOOD PRESSURE: 110 MMHG | DIASTOLIC BLOOD PRESSURE: 65 MMHG

## 2018-09-22 VITALS — SYSTOLIC BLOOD PRESSURE: 89 MMHG | DIASTOLIC BLOOD PRESSURE: 57 MMHG

## 2018-09-22 VITALS — SYSTOLIC BLOOD PRESSURE: 96 MMHG | DIASTOLIC BLOOD PRESSURE: 57 MMHG

## 2018-09-22 DIAGNOSIS — E11.22: ICD-10-CM

## 2018-09-22 DIAGNOSIS — Z91.15: ICD-10-CM

## 2018-09-22 DIAGNOSIS — N18.6: ICD-10-CM

## 2018-09-22 DIAGNOSIS — Z99.2: ICD-10-CM

## 2018-09-22 DIAGNOSIS — I82.B12: Primary | ICD-10-CM

## 2018-09-22 DIAGNOSIS — G04.91: ICD-10-CM

## 2018-09-22 DIAGNOSIS — E44.1: ICD-10-CM

## 2018-09-22 DIAGNOSIS — D63.1: ICD-10-CM

## 2018-09-22 DIAGNOSIS — Z93.3: ICD-10-CM

## 2018-09-22 DIAGNOSIS — N39.0: ICD-10-CM

## 2018-09-22 DIAGNOSIS — I12.0: ICD-10-CM

## 2018-09-22 DIAGNOSIS — F41.9: ICD-10-CM

## 2018-09-22 LAB
ALBUMIN SERPL-MCNC: 3.1 G/DL (ref 3.4–5)
ALP SERPL-CCNC: 85 U/L (ref 46–116)
ALT SERPL-CCNC: 30 U/L (ref 16–63)
AST SERPL-CCNC: 42 U/L (ref 15–37)
BASOPHILS NFR BLD: 1 % (ref 0–2)
BILIRUB SERPL-MCNC: 0.6 MG/DL (ref 0.2–1)
BUN SERPL-MCNC: 11 MG/DL (ref 7–18)
CALCIUM SERPL-MCNC: 9.4 MG/DL (ref 8.5–10.1)
CHLORIDE SERPL-SCNC: 101 MMOL/L (ref 98–107)
CO2 SERPL-SCNC: 31.3 MMOL/L (ref 21–32)
CREAT SERPL-MCNC: 2.5 MG/DL (ref 0.7–1.3)
ERYTHROCYTE [DISTWIDTH] IN BLOOD BY AUTOMATED COUNT: 16.2 % (ref 11.5–14.5)
GFR SERPLBLD BASED ON 1.73 SQ M-ARVRAT: 29 ML/MIN
GLUCOSE SERPL-MCNC: 138 MG/DL (ref 74–106)
PLATELET # BLD: 140 X10^3MCL (ref 130–400)
POTASSIUM SERPL-SCNC: 3.6 MMOL/L (ref 3.5–5.1)
PROT SERPL-MCNC: 7.1 G/DL (ref 6.4–8.2)
SODIUM SERPL-SCNC: 138 MMOL/L (ref 136–145)

## 2018-09-22 PROCEDURE — A4371 SKIN BARRIER POWDER PER OZ: HCPCS

## 2018-09-23 VITALS — DIASTOLIC BLOOD PRESSURE: 67 MMHG | SYSTOLIC BLOOD PRESSURE: 106 MMHG

## 2018-09-23 VITALS — SYSTOLIC BLOOD PRESSURE: 115 MMHG | DIASTOLIC BLOOD PRESSURE: 68 MMHG

## 2018-09-23 VITALS — DIASTOLIC BLOOD PRESSURE: 56 MMHG | SYSTOLIC BLOOD PRESSURE: 93 MMHG

## 2018-09-23 VITALS — DIASTOLIC BLOOD PRESSURE: 67 MMHG | SYSTOLIC BLOOD PRESSURE: 100 MMHG

## 2018-09-23 LAB
BASOPHILS NFR BLD: 1.1 % (ref 0–2)
BUN SERPL-MCNC: 9 MG/DL (ref 7–18)
CALCIUM SERPL-MCNC: 9.2 MG/DL (ref 8.5–10.1)
CHLORIDE SERPL-SCNC: 100 MMOL/L (ref 98–107)
CO2 SERPL-SCNC: 33.9 MMOL/L (ref 21–32)
CREAT SERPL-MCNC: 2 MG/DL (ref 0.7–1.3)
ERYTHROCYTE [DISTWIDTH] IN BLOOD BY AUTOMATED COUNT: 15.9 % (ref 11.5–14.5)
GFR SERPLBLD BASED ON 1.73 SQ M-ARVRAT: 37 ML/MIN
GLUCOSE SERPL-MCNC: 142 MG/DL (ref 74–106)
MICROSCOPIC UR-IMP: YES
PLATELET # BLD: 130 X10^3MCL (ref 130–400)
POTASSIUM SERPL-SCNC: 2.8 MMOL/L (ref 3.5–5.1)
RBC # UR STRIP.AUTO: (no result) /UL
SODIUM SERPL-SCNC: 138 MMOL/L (ref 136–145)
UA SPECIFIC GRAVITY: 1.01 (ref 1–1.03)

## 2018-09-24 VITALS — SYSTOLIC BLOOD PRESSURE: 123 MMHG | DIASTOLIC BLOOD PRESSURE: 79 MMHG

## 2018-09-24 VITALS — SYSTOLIC BLOOD PRESSURE: 112 MMHG | DIASTOLIC BLOOD PRESSURE: 78 MMHG

## 2018-09-24 LAB
BASOPHILS NFR BLD: 0 % (ref 0–2)
BUN SERPL-MCNC: 6 MG/DL (ref 7–18)
CALCIUM SERPL-MCNC: 9.2 MG/DL (ref 8.5–10.1)
CHLORIDE SERPL-SCNC: 103 MMOL/L (ref 98–107)
CO2 SERPL-SCNC: 30.5 MMOL/L (ref 21–32)
CREAT SERPL-MCNC: 1.7 MG/DL (ref 0.7–1.3)
ERYTHROCYTE [DISTWIDTH] IN BLOOD BY AUTOMATED COUNT: 16.3 % (ref 11.5–14.5)
GFR SERPLBLD BASED ON 1.73 SQ M-ARVRAT: 45 ML/MIN
GLUCOSE SERPL-MCNC: 95 MG/DL (ref 74–106)
MONOCYTES NFR BLD: 8 % (ref 0–7)
NEUTS BAND NFR BLD: 0 % (ref 0–10)
NEUTS SEG NFR BLD MANUAL: 35 % (ref 37–75)
PLAT MORPH BLD: (no result)
PLATELET # BLD: 126 X10^3MCL (ref 130–400)
POTASSIUM SERPL-SCNC: 3.1 MMOL/L (ref 3.5–5.1)
RBC MORPH BLD: (no result)
SODIUM SERPL-SCNC: 141 MMOL/L (ref 136–145)

## 2019-03-02 ENCOUNTER — HOSPITAL ENCOUNTER (INPATIENT)
Dept: HOSPITAL 26 - MED | Age: 54
LOS: 4 days | Discharge: NURSING, REHAB OR LONG-TERM CARE | DRG: 466 | End: 2019-03-06
Attending: GENERAL PRACTICE | Admitting: GENERAL PRACTICE
Payer: MEDICAID

## 2019-03-02 VITALS — SYSTOLIC BLOOD PRESSURE: 100 MMHG | DIASTOLIC BLOOD PRESSURE: 52 MMHG

## 2019-03-02 VITALS — BODY MASS INDEX: 24.11 KG/M2 | HEIGHT: 66 IN | WEIGHT: 150 LBS

## 2019-03-02 VITALS — DIASTOLIC BLOOD PRESSURE: 70 MMHG | SYSTOLIC BLOOD PRESSURE: 103 MMHG

## 2019-03-02 VITALS — DIASTOLIC BLOOD PRESSURE: 75 MMHG | SYSTOLIC BLOOD PRESSURE: 118 MMHG

## 2019-03-02 DIAGNOSIS — I12.0: ICD-10-CM

## 2019-03-02 DIAGNOSIS — Z99.2: ICD-10-CM

## 2019-03-02 DIAGNOSIS — E11.51: ICD-10-CM

## 2019-03-02 DIAGNOSIS — Z86.718: ICD-10-CM

## 2019-03-02 DIAGNOSIS — G47.00: ICD-10-CM

## 2019-03-02 DIAGNOSIS — L20.9: ICD-10-CM

## 2019-03-02 DIAGNOSIS — F41.9: ICD-10-CM

## 2019-03-02 DIAGNOSIS — E78.5: ICD-10-CM

## 2019-03-02 DIAGNOSIS — D63.8: ICD-10-CM

## 2019-03-02 DIAGNOSIS — T83.018A: Primary | ICD-10-CM

## 2019-03-02 DIAGNOSIS — N17.0: ICD-10-CM

## 2019-03-02 DIAGNOSIS — N18.6: ICD-10-CM

## 2019-03-02 DIAGNOSIS — Z93.3: ICD-10-CM

## 2019-03-02 DIAGNOSIS — Z86.73: ICD-10-CM

## 2019-03-02 DIAGNOSIS — E11.22: ICD-10-CM

## 2019-03-02 DIAGNOSIS — M19.90: ICD-10-CM

## 2019-03-02 DIAGNOSIS — F25.9: ICD-10-CM

## 2019-03-02 DIAGNOSIS — N30.90: ICD-10-CM

## 2019-03-02 LAB
ALBUMIN FLD-MCNC: 3.7 G/DL (ref 3.4–5)
AMYLASE SERPL-CCNC: 88 U/L (ref 25–115)
ANION GAP SERPL CALCULATED.3IONS-SCNC: 12.1 MMOL/L (ref 8–16)
APPEARANCE UR: (no result)
AST SERPL-CCNC: 26 U/L (ref 15–37)
BASOPHILS # BLD AUTO: 0 K/UL (ref 0–0.22)
BASOPHILS NFR BLD AUTO: 0.6 % (ref 0–2)
BILIRUB SERPL-MCNC: 0.4 MG/DL (ref 0–1)
BILIRUB UR QL STRIP: NEGATIVE
BUN SERPL-MCNC: 69 MG/DL (ref 7–18)
CHLORIDE SERPL-SCNC: 102 MMOL/L (ref 98–107)
CHOLEST/HDLC SERPL: 2.2 {RATIO} (ref 1–4.5)
CO2 SERPL-SCNC: 27.9 MMOL/L (ref 21–32)
COLOR UR: (no result)
CREAT SERPL-MCNC: 3.7 MG/DL (ref 0.7–1.3)
EOSINOPHIL # BLD AUTO: 1.2 K/UL (ref 0–0.4)
EOSINOPHIL NFR BLD AUTO: 16.3 % (ref 0–4)
ERYTHROCYTE [DISTWIDTH] IN BLOOD BY AUTOMATED COUNT: 12.9 % (ref 11.6–13.7)
GFR SERPL CREATININE-BSD FRML MDRD: 22 ML/MIN (ref 90–?)
GLUCOSE SERPL-MCNC: 162 MG/DL (ref 74–106)
GLUCOSE UR STRIP-MCNC: NEGATIVE MG/DL
HCT VFR BLD AUTO: 35.1 % (ref 36–52)
HDLC SERPL-MCNC: 37 MG/DL (ref 40–60)
HGB BLD-MCNC: 11.3 G/DL (ref 12–18)
HGB UR QL STRIP: (no result)
IRON SERPL-MCNC: 55 UG/DL (ref 35–150)
LDLC SERPL CALC-MCNC: 36 MG/DL (ref 60–100)
LEUKOCYTE ESTERASE UR QL STRIP: (no result)
LIPASE SERPL-CCNC: 292 U/L (ref 73–393)
LYMPHOCYTES # BLD AUTO: 1.5 K/UL (ref 2–11.5)
LYMPHOCYTES NFR BLD AUTO: 20.6 % (ref 20.5–51.1)
MAGNESIUM SERPL-MCNC: 2.2 MG/DL (ref 1.8–2.4)
MCH RBC QN AUTO: 28 PG (ref 27–31)
MCHC RBC AUTO-ENTMCNC: 32 G/DL (ref 33–37)
MCV RBC AUTO: 86.9 FL (ref 80–94)
MONOCYTES # BLD AUTO: 0.5 K/UL (ref 0.8–1)
MONOCYTES NFR BLD AUTO: 7.3 % (ref 1.7–9.3)
NEUTROPHILS # BLD AUTO: 4.1 K/UL (ref 1.8–7.7)
NEUTROPHILS NFR BLD AUTO: 55.2 % (ref 42.2–75.2)
NITRITE UR QL STRIP: POSITIVE
PH UR STRIP: 8 [PH] (ref 5–9)
PHOSPHATE SERPL-MCNC: 4.8 MG/DL (ref 2.5–4.9)
PLATELET # BLD AUTO: 95 K/UL (ref 140–450)
POTASSIUM SERPL-SCNC: 4 MMOL/L (ref 3.5–5.1)
PROTHROMBIN TIME: 9.9 SECS (ref 10.8–13.4)
RBC # BLD AUTO: 4.04 MIL/UL (ref 4.2–6.1)
RBC #/AREA URNS HPF: (no result) /HPF (ref 0–5)
SODIUM SERPL-SCNC: 138 MMOL/L (ref 136–145)
TIBC SERPL-MCNC: 148 UG/DL (ref 250–450)
TRIGL SERPL-MCNC: 49 MG/DL (ref 30–150)
TSH SERPL DL<=0.05 MIU/L-ACNC: 1.07 UIU/ML (ref 0.34–3.74)
WBC # BLD AUTO: 7.5 K/UL (ref 4.8–10.8)
WBC,URINE: (no result) /HPF (ref 0–5)

## 2019-03-02 PROCEDURE — P9046 ALBUMIN (HUMAN), 25%, 20 ML: HCPCS

## 2019-03-02 PROCEDURE — 0T9B30Z DRAINAGE OF BLADDER WITH DRAINAGE DEVICE, PERCUTANEOUS APPROACH: ICD-10-PCS

## 2019-03-02 PROCEDURE — 0TPBX0Z REMOVAL OF DRAINAGE DEVICE FROM BLADDER, EXTERNAL APPROACH: ICD-10-PCS

## 2019-03-02 PROCEDURE — 5A1D70Z PERFORMANCE OF URINARY FILTRATION, INTERMITTENT, LESS THAN 6 HOURS PER DAY: ICD-10-PCS

## 2019-03-02 RX ADMIN — CALCIUM ACETATE SCH MG: 667 CAPSULE ORAL at 17:00

## 2019-03-02 RX ADMIN — OXYCODONE HYDROCHLORIDE AND ACETAMINOPHEN SCH MG: 500 TABLET ORAL at 20:41

## 2019-03-02 RX ADMIN — SENNOSIDES A AND B SCH MG: 8.6 TABLET, FILM COATED ORAL at 20:41

## 2019-03-02 RX ADMIN — HYDROCODONE BITARTRATE AND ACETAMINOPHEN PRN TAB: 5; 325 TABLET ORAL at 20:42

## 2019-03-02 RX ADMIN — INSULIN LISPRO PRN UNITS: 100 INJECTION, SOLUTION INTRAVENOUS; SUBCUTANEOUS at 20:42

## 2019-03-02 RX ADMIN — INSULIN LISPRO SCH UNITS: 100 INJECTION, SOLUTION INTRAVENOUS; SUBCUTANEOUS at 21:00

## 2019-03-02 RX ADMIN — Medication SCH GM: at 17:00

## 2019-03-02 RX ADMIN — Medication SCH DEV: at 20:41

## 2019-03-02 NOTE — NUR
PATIENT ADMITTED FROM ED. C/O SUPRAPUBIC CATH MALFUNCTION. PATIENT IS ALERT AND ORIENTED TO 
SELF AND PLACE. FOLLOWS COMMANDS, PERIODS OF CONFUSION. PATIENT ASSISTED IN CHANGING OF 
POSITIONED, NO ACUTE DISTRESS NOTED. COLOSTOMY NOTED LUQ, SMALL SOFT STOOL NOTED. PATIENT 
HAS SUPRAPUBIC CATH TO BAG, NO URINE OUTPUT NOTED. DR. CARRANZA CONSULTED. PATIENT HAS 
MULTIPLE WOUNDS, SKIN KEPT CLEAN AND DRY. IV SITE PATENT AND INTACT. PATIENT ORIENTED TO 
HOSPITAL ENVIRONMENT, VERBALIZED UNDERSTANDING.

## 2019-03-02 NOTE — NUR
SENT FROM [facility]. FOR CLOGGED AND LEAKING SUPRA PUBIC CATH. AS PER 
STAFF, NOTED TODAY. HX: ESRD,DIALYSIS T-TH-SAT.,HIGH CHOL.,COLOSTOMY,FATTY 
LIVER,DYSPHAGIA,DM . DENIES N/V/D; SKIN IS PINK/WARM/DRY; HR EVEN AND REGULAR; 
PT DENIES ANY FEVER, CP, SOB, OR COUGH AT THIS TIME; PATIENT STATES PAIN OF 
7/10 AT THIS TIME; VSS; PATIENT POSITIONED FOR COMFORT; HOB ELEVATED; BEDRAILS 
UP X2; BED DOWN. ER MD MADE AWARE OF PT STATUS.

## 2019-03-02 NOTE — NUR
RECEIVED FROM AM RN IN BED WITH HEMODIALYSIS ON GOING. AWAKE AND ALERT. ABLE TO VERBALIZE 
NEEDS WELL IN Grenadian AND ENGLISH. URINE  BAG  NOTED  WITH PALE RED IN COLOR  URINE RT 
PROCEDURE WAS DONE THIS P.M.  FOR REPAIR OF LEAKING SUPRAPUBIC CATHETER. TELEMETRY 
MONITORING. CARE PLANS FOR THE NIGHT DISCUSSED WITH HIM AND CALL LIGHT PLACED BESIDE HIM IN 
BED FOR EASY ACCESS.

## 2019-03-02 NOTE — NUR
PT. SLEEPING AT THIS TIME. HEMODIALYSIS DONE AT 2145 AND TAKEN OUT WAS 1.2 LITER. NO 
COMPLAINTS DONE. ON TELEMETRY MONITORING.

## 2019-03-02 NOTE — NUR
SUPRAPUBIC CATH REPLACED BY DR. KENYON AT BEDSIDE. IRRIGATED AT BEDSIDE BY MD, PURULENT 
DRAINAGE TO CLEAR BLOOD TINGED WITH CLOTS NOTED. PATIENT STARTED ON HD AT BEDSIDE. NO ACUTE 
DISTRESS NOTED.

## 2019-03-02 NOTE — NUR
-------------------------------------------------------------------------------

            *** Note karina in EDM - 03/02/19 at 1431 by CHERYL ***            

-------------------------------------------------------------------------------

attempted to remove suprapubic cath, withdraw 22 cc yellowish fluid from ballon 
port. maximum resistance unable to pull out. md notified. refilled ballon port 
with 10 mls saline, collecting bag changed. pt verbalized no pain.

## 2019-03-03 VITALS — SYSTOLIC BLOOD PRESSURE: 98 MMHG | DIASTOLIC BLOOD PRESSURE: 65 MMHG

## 2019-03-03 VITALS — DIASTOLIC BLOOD PRESSURE: 50 MMHG | SYSTOLIC BLOOD PRESSURE: 103 MMHG

## 2019-03-03 VITALS — DIASTOLIC BLOOD PRESSURE: 50 MMHG | SYSTOLIC BLOOD PRESSURE: 107 MMHG

## 2019-03-03 VITALS — SYSTOLIC BLOOD PRESSURE: 96 MMHG | DIASTOLIC BLOOD PRESSURE: 60 MMHG

## 2019-03-03 VITALS — DIASTOLIC BLOOD PRESSURE: 60 MMHG | SYSTOLIC BLOOD PRESSURE: 104 MMHG

## 2019-03-03 VITALS — SYSTOLIC BLOOD PRESSURE: 98 MMHG | DIASTOLIC BLOOD PRESSURE: 66 MMHG

## 2019-03-03 LAB
ANION GAP SERPL CALCULATED.3IONS-SCNC: 12.3 MMOL/L (ref 8–16)
BASOPHILS # BLD AUTO: 0.1 K/UL (ref 0–0.22)
BASOPHILS NFR BLD AUTO: 1 % (ref 0–2)
BUN SERPL-MCNC: 33 MG/DL (ref 7–18)
CHLORIDE SERPL-SCNC: 104 MMOL/L (ref 98–107)
CO2 SERPL-SCNC: 29.4 MMOL/L (ref 21–32)
CREAT SERPL-MCNC: 2.4 MG/DL (ref 0.7–1.3)
EOSINOPHIL # BLD AUTO: 1 K/UL (ref 0–0.4)
EOSINOPHIL NFR BLD AUTO: 15.6 % (ref 0–4)
ERYTHROCYTE [DISTWIDTH] IN BLOOD BY AUTOMATED COUNT: 12.7 % (ref 11.6–13.7)
GFR SERPL CREATININE-BSD FRML MDRD: 37 ML/MIN (ref 90–?)
GLUCOSE SERPL-MCNC: 108 MG/DL (ref 74–106)
HCT VFR BLD AUTO: 34.4 % (ref 36–52)
HGB BLD-MCNC: 11.2 G/DL (ref 12–18)
LYMPHOCYTES # BLD AUTO: 1.6 K/UL (ref 2–11.5)
LYMPHOCYTES NFR BLD AUTO: 24.3 % (ref 20.5–51.1)
MAGNESIUM SERPL-MCNC: 1.9 MG/DL (ref 1.8–2.4)
MCH RBC QN AUTO: 28 PG (ref 27–31)
MCHC RBC AUTO-ENTMCNC: 33 G/DL (ref 33–37)
MCV RBC AUTO: 86.1 FL (ref 80–94)
MONOCYTES # BLD AUTO: 0.5 K/UL (ref 0.8–1)
MONOCYTES NFR BLD AUTO: 8.1 % (ref 1.7–9.3)
NEUTROPHILS # BLD AUTO: 3.4 K/UL (ref 1.8–7.7)
NEUTROPHILS NFR BLD AUTO: 51 % (ref 42.2–75.2)
PHOSPHATE SERPL-MCNC: 4.1 MG/DL (ref 2.5–4.9)
PLATELET # BLD AUTO: 90 K/UL (ref 140–450)
POTASSIUM SERPL-SCNC: 3.7 MMOL/L (ref 3.5–5.1)
RBC # BLD AUTO: 4 MIL/UL (ref 4.2–6.1)
SODIUM SERPL-SCNC: 142 MMOL/L (ref 136–145)
WBC # BLD AUTO: 6.6 K/UL (ref 4.8–10.8)

## 2019-03-03 RX ADMIN — Medication SCH GM: at 18:26

## 2019-03-03 RX ADMIN — CALCIUM ACETATE SCH MG: 667 CAPSULE ORAL at 09:39

## 2019-03-03 RX ADMIN — INSULIN LISPRO SCH UNITS: 100 INJECTION, SOLUTION INTRAVENOUS; SUBCUTANEOUS at 11:30

## 2019-03-03 RX ADMIN — INSULIN LISPRO PRN UNITS: 100 INJECTION, SOLUTION INTRAVENOUS; SUBCUTANEOUS at 11:56

## 2019-03-03 RX ADMIN — SENNOSIDES A AND B SCH MG: 8.6 TABLET, FILM COATED ORAL at 09:39

## 2019-03-03 RX ADMIN — Medication SCH DEV: at 16:30

## 2019-03-03 RX ADMIN — Medication SCH EACH: at 09:38

## 2019-03-03 RX ADMIN — INSULIN LISPRO SCH UNITS: 100 INJECTION, SOLUTION INTRAVENOUS; SUBCUTANEOUS at 21:00

## 2019-03-03 RX ADMIN — OXYCODONE HYDROCHLORIDE AND ACETAMINOPHEN SCH MG: 500 TABLET ORAL at 09:41

## 2019-03-03 RX ADMIN — HYDROCODONE BITARTRATE AND ACETAMINOPHEN PRN TAB: 5; 325 TABLET ORAL at 09:53

## 2019-03-03 RX ADMIN — Medication SCH DEV: at 21:00

## 2019-03-03 RX ADMIN — CALCIUM ACETATE SCH MG: 667 CAPSULE ORAL at 18:24

## 2019-03-03 RX ADMIN — FERROUS SULFATE TAB 325 MG (65 MG ELEMENTAL FE) SCH MG: 325 (65 FE) TAB at 09:40

## 2019-03-03 RX ADMIN — Medication SCH GM: at 11:51

## 2019-03-03 RX ADMIN — INSULIN LISPRO SCH UNITS: 100 INJECTION, SOLUTION INTRAVENOUS; SUBCUTANEOUS at 16:30

## 2019-03-03 RX ADMIN — Medication SCH DEV: at 06:20

## 2019-03-03 RX ADMIN — OXYCODONE HYDROCHLORIDE AND ACETAMINOPHEN SCH MG: 500 TABLET ORAL at 21:35

## 2019-03-03 RX ADMIN — SENNOSIDES A AND B SCH MG: 8.6 TABLET, FILM COATED ORAL at 21:35

## 2019-03-03 RX ADMIN — CALCIUM ACETATE SCH MG: 667 CAPSULE ORAL at 12:32

## 2019-03-03 RX ADMIN — DOCUSATE SODIUM SCH MG: 100 CAPSULE, LIQUID FILLED ORAL at 09:40

## 2019-03-03 RX ADMIN — INSULIN LISPRO SCH UNITS: 100 INJECTION, SOLUTION INTRAVENOUS; SUBCUTANEOUS at 06:21

## 2019-03-03 RX ADMIN — Medication SCH DEV: at 11:51

## 2019-03-03 RX ADMIN — Medication SCH GM: at 12:32

## 2019-03-03 RX ADMIN — WHITE PETROLATUM SCH EA: 57 PASTE TOPICAL at 09:00

## 2019-03-03 RX ADMIN — Medication SCH TAB: at 09:41

## 2019-03-03 RX ADMIN — ATORVASTATIN CALCIUM SCH MG: 20 TABLET, FILM COATED ORAL at 09:41

## 2019-03-03 RX ADMIN — WHITE PETROLATUM SCH EA: 57 PASTE TOPICAL at 21:36

## 2019-03-03 NOTE — NUR
PT IS AWAKE AND ORAL MEDICATION WAS GIVEN AND PT TOLERATED IT, CATHETER WAS IRRIGATED AGAIN 
WITH 100ML WATER. NO SIGN OF DISTRESS NOTED. WILL MONITOR PT.

## 2019-03-03 NOTE — NUR
PT IS AWAKE AND LYING ON THE BED, VITAL SIGNS TAKEN AND IS WITHIN NORMAL LIMIT. BLOOD 
GLUCOSE CHECKED AND RESULT  AND NO INSULIN COVERAGE NEEDED. WILL MONITOR PT.

## 2019-03-03 NOTE — NUR
PATIENT HAS BEEN SCREENED AND CATEGORIZED AS HIGH NUTRITION RISK. PATIENT WILL BE SEEN 
WITHIN 1-2 DAYS OF ADMISSION.



03/03/19-03/04/19 



REN SANCHEZ MS, RDN

## 2019-03-03 NOTE — NUR
INFORMED RESIDENT MD RE REPETITIVE ORDER OF HUMALOG INSULIN ADMINISTRATION. INFORMED 
RESIDENT THAT THERE IS AN AUTOMATIC HUMALOG INSULIN SLIDING SCALE .  ADMINISTERED INSULIN 
FOLLOWING SLIDING SCALE . "OK"  NO FURTHER ORDERS GIVEN.

## 2019-03-03 NOTE — NUR
RECEIVED PT FROM LIN RN PT Japanese SPEAKER AAOX2 COMPLAINTS OF GENERALIZED WEAKNESS 
COLOSTOMY BAG EMPY PASTE STOOL SUPRAPUBIC CATHHETER IRRIGATED DRAINING  REDISH COLOR URINE 
REPOAITIONED INITIAL ASSESSMENT DONE

## 2019-03-03 NOTE — NUR
AWAKE AT THIS TIME. PT. NEEDS MET. NO COMPLAINTS OF PAIN AT THIS TIME. TELEMETRY MONITORING. 
CNA PERSONAL HYGIENE RENDERED BY CNAS. TURNED TO SIDES Q 2H. PILLOW SUPPORT PROVIDED TO 
PRESSURE AREAS.

## 2019-03-03 NOTE — NUR
RECEIVED PT FROM NIGHT SHIFT NURSE, PT IS AWAKE AND LYING ON THE BED WITH SIDE RAILS UP AND 
ELBERT LIGHT WITHIN REACH, PT HAS AN IV LINE ON THE LEFT HAND G.24 ON SALINE LOCK, INTACT, PT 
DENIES PAIN AND NO SOB NOTED. NO SIGN OF DISTRESS NOTED, WILL CONTINUE TO MONITOR PT.

## 2019-03-03 NOTE — NUR
PT IS AWAKE AND ORAL MEDICATION GIVEN, PT TOLERATED IT AND NO SIGN OF DISTRESS NOTED. WILL 
CONTINUE TO MONITOR PT.

## 2019-03-03 NOTE — NUR
PT IS AWAKE AND ORAL MEDICATIONS WERE GIVEN AND PT TOLERATED IT. NO SIGN OF DISTRESS NOTED 
AND WILL MONITOR PT.

## 2019-03-03 NOTE — NUR
PT IS AWAKE AND VITAL SIGNS TAKEN AND IS WITHIN NORMAL LIMIT, NO SIGN OF DISTRESS NOTED AND 
WILL MONITOR, PT.

## 2019-03-03 NOTE — NUR
BLOOD SUGAR CHECK PER FINGERSTICK DONE 111. NO HUMALOG COVERAGE GIVEN. AWAKE AND ALERT. ABLE 
TO VERBALIZE NEEDS WELL IN Frisian AND ENGLISH.

## 2019-03-04 VITALS — SYSTOLIC BLOOD PRESSURE: 90 MMHG | DIASTOLIC BLOOD PRESSURE: 50 MMHG

## 2019-03-04 VITALS — SYSTOLIC BLOOD PRESSURE: 106 MMHG | DIASTOLIC BLOOD PRESSURE: 65 MMHG

## 2019-03-04 VITALS — DIASTOLIC BLOOD PRESSURE: 64 MMHG | SYSTOLIC BLOOD PRESSURE: 108 MMHG

## 2019-03-04 VITALS — SYSTOLIC BLOOD PRESSURE: 105 MMHG | DIASTOLIC BLOOD PRESSURE: 58 MMHG

## 2019-03-04 VITALS — DIASTOLIC BLOOD PRESSURE: 64 MMHG | SYSTOLIC BLOOD PRESSURE: 113 MMHG

## 2019-03-04 VITALS — SYSTOLIC BLOOD PRESSURE: 115 MMHG | DIASTOLIC BLOOD PRESSURE: 76 MMHG

## 2019-03-04 LAB
ANION GAP SERPL CALCULATED.3IONS-SCNC: 14.3 MMOL/L (ref 8–16)
BASOPHILS # BLD AUTO: 0 K/UL (ref 0–0.22)
BASOPHILS NFR BLD AUTO: 0.5 % (ref 0–2)
BUN SERPL-MCNC: 43 MG/DL (ref 7–18)
CHLORIDE SERPL-SCNC: 100 MMOL/L (ref 98–107)
CO2 SERPL-SCNC: 27.6 MMOL/L (ref 21–32)
CREAT SERPL-MCNC: 3.1 MG/DL (ref 0.7–1.3)
EOSINOPHIL # BLD AUTO: 1.1 K/UL (ref 0–0.4)
EOSINOPHIL NFR BLD AUTO: 13.2 % (ref 0–4)
ERYTHROCYTE [DISTWIDTH] IN BLOOD BY AUTOMATED COUNT: 12.5 % (ref 11.6–13.7)
GFR SERPL CREATININE-BSD FRML MDRD: 27 ML/MIN (ref 90–?)
GLUCOSE SERPL-MCNC: 108 MG/DL (ref 74–106)
HCT VFR BLD AUTO: 32.8 % (ref 36–52)
HGB BLD-MCNC: 11 G/DL (ref 12–18)
LYMPHOCYTES # BLD AUTO: 1.5 K/UL (ref 2–11.5)
LYMPHOCYTES NFR BLD AUTO: 18.1 % (ref 20.5–51.1)
MAGNESIUM SERPL-MCNC: 1.7 MG/DL (ref 1.8–2.4)
MCH RBC QN AUTO: 28 PG (ref 27–31)
MCHC RBC AUTO-ENTMCNC: 34 G/DL (ref 33–37)
MCV RBC AUTO: 84.9 FL (ref 80–94)
MONOCYTES # BLD AUTO: 0.7 K/UL (ref 0.8–1)
MONOCYTES NFR BLD AUTO: 8.1 % (ref 1.7–9.3)
NEUTROPHILS # BLD AUTO: 4.9 K/UL (ref 1.8–7.7)
NEUTROPHILS NFR BLD AUTO: 60.1 % (ref 42.2–75.2)
PHOSPHATE SERPL-MCNC: 4.3 MG/DL (ref 2.5–4.9)
PLATELET # BLD AUTO: 96 K/UL (ref 140–450)
POTASSIUM SERPL-SCNC: 3.9 MMOL/L (ref 3.5–5.1)
RBC # BLD AUTO: 3.87 MIL/UL (ref 4.2–6.1)
SODIUM SERPL-SCNC: 138 MMOL/L (ref 136–145)
WBC # BLD AUTO: 8.2 K/UL (ref 4.8–10.8)

## 2019-03-04 RX ADMIN — Medication SCH DEV: at 12:26

## 2019-03-04 RX ADMIN — CALCIUM ACETATE SCH MG: 667 CAPSULE ORAL at 12:18

## 2019-03-04 RX ADMIN — Medication SCH DEV: at 06:45

## 2019-03-04 RX ADMIN — Medication SCH DEV: at 20:56

## 2019-03-04 RX ADMIN — OXYCODONE HYDROCHLORIDE AND ACETAMINOPHEN SCH MG: 500 TABLET ORAL at 09:08

## 2019-03-04 RX ADMIN — INSULIN LISPRO SCH UNITS: 100 INJECTION, SOLUTION INTRAVENOUS; SUBCUTANEOUS at 06:45

## 2019-03-04 RX ADMIN — Medication SCH EACH: at 09:08

## 2019-03-04 RX ADMIN — MORPHINE SULFATE PRN MG: 2 INJECTION, SOLUTION INTRAMUSCULAR; INTRAVENOUS at 21:00

## 2019-03-04 RX ADMIN — FERROUS SULFATE TAB 325 MG (65 MG ELEMENTAL FE) SCH MG: 325 (65 FE) TAB at 09:09

## 2019-03-04 RX ADMIN — WHITE PETROLATUM SCH EA: 57 PASTE TOPICAL at 13:00

## 2019-03-04 RX ADMIN — SENNOSIDES A AND B SCH MG: 8.6 TABLET, FILM COATED ORAL at 09:08

## 2019-03-04 RX ADMIN — INSULIN LISPRO SCH UNITS: 100 INJECTION, SOLUTION INTRAVENOUS; SUBCUTANEOUS at 21:00

## 2019-03-04 RX ADMIN — INSULIN LISPRO SCH UNITS: 100 INJECTION, SOLUTION INTRAVENOUS; SUBCUTANEOUS at 18:09

## 2019-03-04 RX ADMIN — Medication SCH GM: at 17:00

## 2019-03-04 RX ADMIN — CALCIUM ACETATE SCH MG: 667 CAPSULE ORAL at 09:08

## 2019-03-04 RX ADMIN — OXYCODONE HYDROCHLORIDE AND ACETAMINOPHEN SCH MG: 500 TABLET ORAL at 20:54

## 2019-03-04 RX ADMIN — CALCIUM ACETATE SCH MG: 667 CAPSULE ORAL at 18:04

## 2019-03-04 RX ADMIN — Medication SCH GM: at 09:00

## 2019-03-04 RX ADMIN — Medication SCH TAB: at 09:09

## 2019-03-04 RX ADMIN — SENNOSIDES A AND B SCH MG: 8.6 TABLET, FILM COATED ORAL at 20:54

## 2019-03-04 RX ADMIN — DOCUSATE SODIUM SCH MG: 100 CAPSULE, LIQUID FILLED ORAL at 09:09

## 2019-03-04 RX ADMIN — ATORVASTATIN CALCIUM SCH MG: 20 TABLET, FILM COATED ORAL at 09:08

## 2019-03-04 RX ADMIN — WHITE PETROLATUM SCH EA: 57 PASTE TOPICAL at 09:00

## 2019-03-04 RX ADMIN — Medication SCH GM: at 13:00

## 2019-03-04 RX ADMIN — INSULIN LISPRO SCH UNITS: 100 INJECTION, SOLUTION INTRAVENOUS; SUBCUTANEOUS at 12:31

## 2019-03-04 RX ADMIN — Medication SCH DEV: at 16:30

## 2019-03-04 NOTE — NUR
DUE MEDICATION ADMINISTERED, PT STATED HAVING GENERALIZED PAIN ON SEVERAL PARTS OF HIS BODY 
7/10, PAIN MEDICATION PER DR ORDERED ADMINISTERED, PT TOLERATED WELL, NO DISTRESS NOTED, PT 
BLOOD SUGAR 94, HUMALOG ORDERED 4 UNITS HELD, DR. HARDY NOTIFIED. PT RESTING, NO DISTRESS 
NOTED, CALL LIGHT WITHIN REACH, WILL CONTINUE TO MONITOR.

## 2019-03-04 NOTE — NUR
ADMINISTERED ROCEPHIN AS ORDERED. TOLERATED WELL. NO SIGN OF DISTRESS NOTED. CALL LIGHT 
WITHIN REACH. WILL CONTINUE TO MONITOR PT.

## 2019-03-04 NOTE — NUR
WOUND CARE EVALUATION NOTE:

REASON FOR EVALUATION: SACRALCOCCYX WOUNDS

SKIN ASSESSMENT DONE WITH THIS 54 Y/O MALE [facility] 
WITH INITIAL DX LEAKING SUPRAPUBIC CATH.PAST MEDICAL HX: ESRD ON HD, SUPRAPUBIC CATH, 
COLOSTOMY, CVA, DVT, HTN, HLD, PAD AND SCHIZOAFFECTIVE DISORDER.PT IS AWAKE. SKIN IS WARM 
AND DRY, BLE FEW HAIR GROWTH, NO EDEMA.  DORSAL PEDAL PULSES PRESENT AND NORMAL. CAPILLARY 
REFILLED < 2 SEC. X 10 TOES. BILATERAL HEELS THIN CALLUSES. PLAN OF CARE DISCUSSED WITH 
PRIMARY RN. DR. MARIN AT BED SIDE, TREATMENT PLAN DISCUSSED.



INTEGUMENTARY:

-LLQ ABDOMEN COLOSTOMY FUNCTIONING WITH SMALL AMOUNT SOFT BROWN STOOL OUTPUT.

-SUPRAPUBIC SITE, STOMA RED AND MOIST, NO ODOR, RANJEET-STOMA SKIN INTACT. CATHETER IN PATENT 
FUNCTIONING WITH HEMATURIA

-PRESSURE INJURIES STAGE 2 TO SACRALCOCCYX 2X2X0.1 CM WOUND BED RED, MOIST, NO ODOR, 
RANJEET-WOUND SKIN DENUDED SURROUNDING REDNESS MERGED WITH L/B BUTTOCKS IAD 

-INCONTINENT ASSOCIATE DERMATITIS (IAD) TO: REDNESS B/L BUTTOCKS WITH MULTIPLE SKIN EROSIONS 
WITH LARGEST TO RIGHT BUTTOCK 3X4XX0.1 CM, WOUND BED ARE RED AND MOIST, NO ODOR, PERIWOUND 
SKIN DENUDED 7X9CM 

-DRY SCALY SKIN TO LOWER EXTREMITIES, SKIN INTACT.



RECOMMENDATIONS:

-CLEANSE SACRALCOCCYX AND R/L BUTTOCKS WITH SOAP AND WATER PAT DRY, APPLY Z-GUARD AND COVER 
WITH OPTIFORM BIDWC AND PRN IF SOILING

-APPLY BODY LOTION TO BLE DAILY

-OFFLOAD BILATERAL HEELS BY PLACING PILLOWS UNDER CALVES UNLESS OTHERWISE CONTRAINDICATED

 -PRESSURE REDISTRIBUTION SURFACE THERAPY

-TURN AND REPOSITION Q2H, OFFLOAD SACRALCOCCYX AND BUTTOCKS BY TURNING RIGHT AND LEFT

-CONTINUE TO FOLLOW RD RECOMMENDATIONS



ALL ABOVE RECOMMENDATIONS DISCUSSED WITH PRIMARY RN.

WILL FOLLOW UP PT Q7-10 DAYS. PLEASE CONTACT WOUND CARE NURSE FOR ANY QUESTION AND CHANGE OF 
WOUND CONDITION.

## 2019-03-04 NOTE — NUR
RECEIVED BEDSIDE REPORT FROM DAY SHIFT NURSE ELISEO RN, PT STABLE, NO DISTRESS NOTED, IV TO L 
HAND 24 G, PATENT, INTACT, SL, PT ON ROOM AIR, NO SOB, PT HAS NO C/O PAIN AT THIS MOMENT, 
SUPRAPUBIC CATH IN PLACE DRAINING URINE, COLOSTOMY BAG IN PLACE, PAT CATH IN PLACE TO 
THE R UPPER CHEST, INITIAL ASSESSMENT DONE, ALL SAFETY PRECAUTION MET, CALL LIGHT WITHIN 
REACH, WILL CONTINUE TO MONITOR.

## 2019-03-04 NOTE — NUR
BLOOD SUGAR TEST 106 , PT REMAIN STABLE NOT DISTRESS NOTED  ON TELEMETRY SR  SUPRAPUBIC 
CATHETER FLUSHING WITH NS AND  DRAINING GETTING CLEAR

## 2019-03-04 NOTE — NUR
CHECKED ON PT. APPLIED Z-GUARD AND CORTISONE TO PT AS ORDERED. REPOSITIONED PT. NO LEAKAGE 
FROM CATHETER. PT DENIES ANY DISTRESS. CALL LIGHT WITHIN PT REACH. WILL CONTINUE TO MONITOR 
PT.

## 2019-03-04 NOTE — NUR
PT HAS BEEN MONITORING CLOSE REPOSITIONED Q2H ON TELMETRY SR AND PT HAS BEEN FLUSHING  
SUPRAPUBIC CATHETER WITHNS AS ORDER AND  URINE GETTING PINK

## 2019-03-04 NOTE — NUR
CHECKED ON PT. ADMINISTERED HUMOLOG AS ORDERED IN [facility] . WILL PASS INFORMATION TO PM NURSE. 
NO SIGN OF DISTRESS NOTED. CALL LIGHT WITHIN REACH. HELPED PT TO CHANGE THE POSITION. WILL 
CONTINUE TO MONITOR PT.

## 2019-03-04 NOTE — NUR
ADMINISTERED MEDS TO PT AS ORDERED. PT TRANSFERRED TO DIFFERENT BED AS BED WAS NOT WORKING. 
PT WITH CNA, EATING HIS BREAKFAST. PT TOLERATED WELL. NO SIGN OF DISTRESS NOTED. ALL SAFETY 
MEASURE IN PLACE. WILL CONTINUE TO MONITOR PT.

## 2019-03-04 NOTE — NUR
3/4/19 RD INITIAL ASSESSMENT COMPLETED



PLEASE REFER TO NUTRITION ASSESSMENT UNDER CARE ACTIVITY FOR ESTIMATED NUTRITIONAL NEEDS. 



RD RECOMMENDATIONS:



1. CONTINUE CCHO 60 GM & RENAL DIET WITH NECTAR THICK LIQUIDS AS TOLERATED 

2. RECOMMEND NEPRO BID IF PO INTAKE <50%

3. CONTINUE NEPHRO-CARA AND VITAMIN C FOR WOUND HEALING

4. RD OFFERED NUTRITION EDUCATION FOR DIABETES AND RENAL DIET, PATIENT DECLINED. ATTEMPT 
EDUCATION ON FOLLOW UP VISIT.

5. RD TO FOLLOW-UP 3-5 DAYS, MODERATE RISK 



MIRYAM REDMAN RD

## 2019-03-05 VITALS — SYSTOLIC BLOOD PRESSURE: 118 MMHG | DIASTOLIC BLOOD PRESSURE: 56 MMHG

## 2019-03-05 VITALS — SYSTOLIC BLOOD PRESSURE: 109 MMHG | DIASTOLIC BLOOD PRESSURE: 64 MMHG

## 2019-03-05 VITALS — DIASTOLIC BLOOD PRESSURE: 67 MMHG | SYSTOLIC BLOOD PRESSURE: 111 MMHG

## 2019-03-05 VITALS — DIASTOLIC BLOOD PRESSURE: 65 MMHG | SYSTOLIC BLOOD PRESSURE: 113 MMHG

## 2019-03-05 VITALS — DIASTOLIC BLOOD PRESSURE: 57 MMHG | SYSTOLIC BLOOD PRESSURE: 102 MMHG

## 2019-03-05 LAB
ANION GAP SERPL CALCULATED.3IONS-SCNC: 11.5 MMOL/L (ref 8–16)
BASOPHILS # BLD AUTO: 0.1 K/UL (ref 0–0.22)
BASOPHILS NFR BLD AUTO: 0.7 % (ref 0–2)
BUN SERPL-MCNC: 45 MG/DL (ref 7–18)
CHLORIDE SERPL-SCNC: 103 MMOL/L (ref 98–107)
CO2 SERPL-SCNC: 28.2 MMOL/L (ref 21–32)
CREAT SERPL-MCNC: 3.2 MG/DL (ref 0.7–1.3)
EOSINOPHIL # BLD AUTO: 1.1 K/UL (ref 0–0.4)
EOSINOPHIL NFR BLD AUTO: 16.4 % (ref 0–4)
ERYTHROCYTE [DISTWIDTH] IN BLOOD BY AUTOMATED COUNT: 12.3 % (ref 11.6–13.7)
FERRITIN SERPL-MCNC: 724 NG/ML (ref 30–400)
GFR SERPL CREATININE-BSD FRML MDRD: 26 ML/MIN (ref 90–?)
GLUCOSE SERPL-MCNC: 154 MG/DL (ref 74–106)
HCT VFR BLD AUTO: 33.8 % (ref 36–52)
HGB BLD-MCNC: 10.9 G/DL (ref 12–18)
LYMPHOCYTES # BLD AUTO: 1.5 K/UL (ref 2–11.5)
LYMPHOCYTES NFR BLD AUTO: 21.7 % (ref 20.5–51.1)
MCH RBC QN AUTO: 28 PG (ref 27–31)
MCHC RBC AUTO-ENTMCNC: 32 G/DL (ref 33–37)
MCV RBC AUTO: 86.6 FL (ref 80–94)
MONOCYTES # BLD AUTO: 0.7 K/UL (ref 0.8–1)
MONOCYTES NFR BLD AUTO: 10.9 % (ref 1.7–9.3)
NEUTROPHILS # BLD AUTO: 3.4 K/UL (ref 1.8–7.7)
NEUTROPHILS NFR BLD AUTO: 50.3 % (ref 42.2–75.2)
PLATELET # BLD AUTO: 94 K/UL (ref 140–450)
POTASSIUM SERPL-SCNC: 3.7 MMOL/L (ref 3.5–5.1)
RBC # BLD AUTO: 3.9 MIL/UL (ref 4.2–6.1)
SODIUM SERPL-SCNC: 139 MMOL/L (ref 136–145)
TRANSFERRIN SERPL-MCNC: 141 MG/DL (ref 200–370)
WBC # BLD AUTO: 6.8 K/UL (ref 4.8–10.8)

## 2019-03-05 PROCEDURE — 5A1D70Z PERFORMANCE OF URINARY FILTRATION, INTERMITTENT, LESS THAN 6 HOURS PER DAY: ICD-10-PCS

## 2019-03-05 RX ADMIN — INSULIN LISPRO SCH UNITS: 100 INJECTION, SOLUTION INTRAVENOUS; SUBCUTANEOUS at 14:17

## 2019-03-05 RX ADMIN — CALCIUM ACETATE SCH MG: 667 CAPSULE ORAL at 17:17

## 2019-03-05 RX ADMIN — Medication SCH GM: at 13:00

## 2019-03-05 RX ADMIN — OXYCODONE HYDROCHLORIDE AND ACETAMINOPHEN SCH MG: 500 TABLET ORAL at 10:24

## 2019-03-05 RX ADMIN — Medication SCH GM: at 09:00

## 2019-03-05 RX ADMIN — ATORVASTATIN CALCIUM SCH MG: 20 TABLET, FILM COATED ORAL at 10:24

## 2019-03-05 RX ADMIN — Medication SCH GM: at 17:21

## 2019-03-05 RX ADMIN — SENNOSIDES A AND B SCH MG: 8.6 TABLET, FILM COATED ORAL at 10:24

## 2019-03-05 RX ADMIN — FERROUS SULFATE TAB 325 MG (65 MG ELEMENTAL FE) SCH MG: 325 (65 FE) TAB at 10:23

## 2019-03-05 RX ADMIN — INSULIN LISPRO SCH UNITS: 100 INJECTION, SOLUTION INTRAVENOUS; SUBCUTANEOUS at 20:53

## 2019-03-05 RX ADMIN — WHITE PETROLATUM SCH EA: 57 PASTE TOPICAL at 00:30

## 2019-03-05 RX ADMIN — MORPHINE SULFATE PRN MG: 2 INJECTION, SOLUTION INTRAMUSCULAR; INTRAVENOUS at 00:18

## 2019-03-05 RX ADMIN — DOCUSATE SODIUM SCH MG: 100 CAPSULE, LIQUID FILLED ORAL at 10:23

## 2019-03-05 RX ADMIN — CALCIUM ACETATE SCH MG: 667 CAPSULE ORAL at 10:23

## 2019-03-05 RX ADMIN — SENNOSIDES A AND B SCH MG: 8.6 TABLET, FILM COATED ORAL at 20:51

## 2019-03-05 RX ADMIN — Medication SCH DEV: at 20:53

## 2019-03-05 RX ADMIN — WHITE PETROLATUM SCH EA: 57 PASTE TOPICAL at 13:00

## 2019-03-05 RX ADMIN — Medication SCH EACH: at 10:23

## 2019-03-05 RX ADMIN — Medication SCH DEV: at 17:21

## 2019-03-05 RX ADMIN — INSULIN LISPRO SCH UNITS: 100 INJECTION, SOLUTION INTRAVENOUS; SUBCUTANEOUS at 17:21

## 2019-03-05 RX ADMIN — Medication SCH DEV: at 05:55

## 2019-03-05 RX ADMIN — Medication SCH TAB: at 10:25

## 2019-03-05 RX ADMIN — HYDROCODONE BITARTRATE AND ACETAMINOPHEN PRN TAB: 5; 325 TABLET ORAL at 20:53

## 2019-03-05 RX ADMIN — Medication SCH DEV: at 11:30

## 2019-03-05 RX ADMIN — INSULIN LISPRO SCH UNITS: 100 INJECTION, SOLUTION INTRAVENOUS; SUBCUTANEOUS at 06:36

## 2019-03-05 RX ADMIN — CALCIUM ACETATE SCH MG: 667 CAPSULE ORAL at 14:01

## 2019-03-05 RX ADMIN — OXYCODONE HYDROCHLORIDE AND ACETAMINOPHEN SCH MG: 500 TABLET ORAL at 20:51

## 2019-03-05 NOTE — NUR
RECEIVED BEDSIDE REPORT FROM DAY SHIFT RN SITAL, PT SLEEPING, IV TO L HAND 24G PATENT, 
INTACT, SL, PT ON ROOM AIR, NO SOB NOTED, COLOSTOMY BAG INTACT, SUPRAPUBIC CATH IN PLACE 
DRAINING DARK ROSA COLORED URINE, INITIAL ASSESSMENT DONE, ALL SAFETY PRECAUTION MET, CALL 
LIGHT WITHIN REACH, WILL CONTINUE TO MONITOR.

## 2019-03-05 NOTE — NUR
ENDORSED PT TO DAY SHIFT NURSE SITAL RN, FOR CONTINUOUS OF CARE. PT STABLE, NO DISTRESS 
NOTED, CALL LIGHT WITHIN REACH.

## 2019-03-05 NOTE — NUR
ADMINISTERED MEDS TO PT AS ORDERED. ADMINISTERED ALBUMIN.BP MEDS ON HOLD. PT WITH DIALYSIS 
NURSE. NO SIGN OF DISTRESS NOTED. ALL SAFETY MEASURE IN PLACE. WILL CONTINUE TO MONITOR PT.

## 2019-03-05 NOTE — NUR
CHECKED ON PT. ADMINISTERED INSULIN AS PER COVERAGE ON [facility].  AT THIS TIME. 
ADMINISTERED MEDS AS ORDERED. TOLERATED WELL . NO SIGN OF DISTRESS NOTED. CALL LIGHT WITHIN 
PT REACH. REPOSITIONED PT . WILL CONTINUE TO MONITOR PT.

## 2019-03-05 NOTE — NUR
CHECKED ON PT, PT SLEEPING, NO DISTRESS NOTED, V/S TAKEN, WNL, CALL LIGHT WITHIN REACH, WILL 
CONTINUE TO MONITOR.

## 2019-03-05 NOTE — NUR
CHECKED O PT. SLEEPING AT THIS TIME. REPOSITIONED PT WITH HELP OF CNA. CHANGED DRESSING. NO 
SIGN OF DISTRESS NOTED. WILL CONTINUE TO MONITOR PT.

## 2019-03-05 NOTE — NUR
CHECKED ON PT, V/S TAKEN, WNL, PT STATED HAVING PAIN 9/10, PAIN MEDICATION PER DR ORDER 
ADMINISTERED, PT TOLERATED WELL, CALL LIGHT WITHIN REACH, WILL CONTINUE TO MONITOR.

## 2019-03-06 VITALS — SYSTOLIC BLOOD PRESSURE: 120 MMHG | DIASTOLIC BLOOD PRESSURE: 65 MMHG

## 2019-03-06 VITALS — DIASTOLIC BLOOD PRESSURE: 63 MMHG | SYSTOLIC BLOOD PRESSURE: 107 MMHG

## 2019-03-06 VITALS — SYSTOLIC BLOOD PRESSURE: 118 MMHG | DIASTOLIC BLOOD PRESSURE: 64 MMHG

## 2019-03-06 LAB
ANION GAP SERPL CALCULATED.3IONS-SCNC: 12.5 MMOL/L (ref 8–16)
BASOPHILS # BLD AUTO: 0.1 K/UL (ref 0–0.22)
BASOPHILS NFR BLD AUTO: 0.9 % (ref 0–2)
BUN SERPL-MCNC: 33 MG/DL (ref 7–18)
CHLORIDE SERPL-SCNC: 105 MMOL/L (ref 98–107)
CO2 SERPL-SCNC: 27.4 MMOL/L (ref 21–32)
CREAT SERPL-MCNC: 2.7 MG/DL (ref 0.7–1.3)
EOSINOPHIL # BLD AUTO: 1.2 K/UL (ref 0–0.4)
EOSINOPHIL NFR BLD AUTO: 19.6 % (ref 0–4)
ERYTHROCYTE [DISTWIDTH] IN BLOOD BY AUTOMATED COUNT: 12.4 % (ref 11.6–13.7)
GFR SERPL CREATININE-BSD FRML MDRD: 32 ML/MIN (ref 90–?)
GLUCOSE SERPL-MCNC: 99 MG/DL (ref 74–106)
HCT VFR BLD AUTO: 32.7 % (ref 36–52)
HGB BLD-MCNC: 10.6 G/DL (ref 12–18)
LYMPHOCYTES # BLD AUTO: 1.6 K/UL (ref 2–11.5)
LYMPHOCYTES NFR BLD AUTO: 26.3 % (ref 20.5–51.1)
MCH RBC QN AUTO: 28 PG (ref 27–31)
MCHC RBC AUTO-ENTMCNC: 33 G/DL (ref 33–37)
MCV RBC AUTO: 85.8 FL (ref 80–94)
MONOCYTES # BLD AUTO: 0.6 K/UL (ref 0.8–1)
MONOCYTES NFR BLD AUTO: 9.3 % (ref 1.7–9.3)
NEUTROPHILS # BLD AUTO: 2.6 K/UL (ref 1.8–7.7)
NEUTROPHILS NFR BLD AUTO: 43.9 % (ref 42.2–75.2)
PLATELET # BLD AUTO: 102 K/UL (ref 140–450)
POTASSIUM SERPL-SCNC: 3.9 MMOL/L (ref 3.5–5.1)
RBC # BLD AUTO: 3.82 MIL/UL (ref 4.2–6.1)
SODIUM SERPL-SCNC: 141 MMOL/L (ref 136–145)
WBC # BLD AUTO: 6 K/UL (ref 4.8–10.8)

## 2019-03-06 RX ADMIN — Medication SCH TAB: at 09:11

## 2019-03-06 RX ADMIN — WHITE PETROLATUM SCH EA: 57 PASTE TOPICAL at 13:27

## 2019-03-06 RX ADMIN — Medication SCH DEV: at 16:30

## 2019-03-06 RX ADMIN — Medication SCH DEV: at 12:17

## 2019-03-06 RX ADMIN — Medication SCH GM: at 09:12

## 2019-03-06 RX ADMIN — INSULIN LISPRO SCH UNITS: 100 INJECTION, SOLUTION INTRAVENOUS; SUBCUTANEOUS at 13:26

## 2019-03-06 RX ADMIN — INSULIN LISPRO SCH UNITS: 100 INJECTION, SOLUTION INTRAVENOUS; SUBCUTANEOUS at 07:00

## 2019-03-06 RX ADMIN — WHITE PETROLATUM SCH EA: 57 PASTE TOPICAL at 01:14

## 2019-03-06 RX ADMIN — DOCUSATE SODIUM SCH MG: 100 CAPSULE, LIQUID FILLED ORAL at 09:10

## 2019-03-06 RX ADMIN — FERROUS SULFATE TAB 325 MG (65 MG ELEMENTAL FE) SCH MG: 325 (65 FE) TAB at 09:11

## 2019-03-06 RX ADMIN — CALCIUM ACETATE SCH MG: 667 CAPSULE ORAL at 09:10

## 2019-03-06 RX ADMIN — Medication SCH EACH: at 09:10

## 2019-03-06 RX ADMIN — INSULIN LISPRO SCH UNITS: 100 INJECTION, SOLUTION INTRAVENOUS; SUBCUTANEOUS at 16:30

## 2019-03-06 RX ADMIN — Medication SCH GM: at 13:27

## 2019-03-06 RX ADMIN — SENNOSIDES A AND B SCH MG: 8.6 TABLET, FILM COATED ORAL at 09:10

## 2019-03-06 RX ADMIN — OXYCODONE HYDROCHLORIDE AND ACETAMINOPHEN SCH MG: 500 TABLET ORAL at 09:10

## 2019-03-06 RX ADMIN — Medication SCH DEV: at 07:00

## 2019-03-06 RX ADMIN — CALCIUM ACETATE SCH MG: 667 CAPSULE ORAL at 13:26

## 2019-03-06 RX ADMIN — ATORVASTATIN CALCIUM SCH MG: 20 TABLET, FILM COATED ORAL at 09:11

## 2019-03-06 NOTE — NUR
ENDORSED PT TO DAY SHIFT NURSE LIZZ AREVALO, PT STABLE, NO DISTRESS NOTED, CALL LIGHT WITHIN 
REACH.

## 2019-03-06 NOTE — NUR
WOUND CARE GIVEN TO PT, DRESSING CHANGED, PT TOLERATED WELL, NO DISTRESS NOTED, CALL LIGHT 
WITHIN REACH, WILL CONTINUE TO MONITOR.

## 2019-03-06 NOTE — NUR
Informed Susie MONSALVE and Stefan AREVALO pt is going to [facility] room [address] 
[address] [place] [address] [place]. [address], Phone number [phone]. The accepting MD is 
Dr. Lara. Instructed Stefan AREVALO to call [facility] and give report and informed them 
the last dialysis of the pt. [facility] wants the pt after 1800 tonight. Instructed Susie MONSALVE to arranged transportation with [employer] [phone].

## 2019-03-06 NOTE — NUR
PT DISCHARGED TO [facility]. PT DISCHARGE PAPERWORK UNABLE TO BE SIGNED 
BY PT. WRIST BAND WAS REMOVED. IV INTACT FOR CONTINUITY OF CARE AT [facility]. CALLED AND 
GAVE REPORT TO MICEKY LAINEZ. PT TOOK ALL PERSONAL BELONGINGS WITH HIM. PT LEFT IN STABLE 
CONDITION. COPY OF CHART GIVEN TO TRANSPORT TEAM TO BE HANDED OFF TO ACCEPTING RN. ALL NEEDS 
MET.

## 2019-03-06 NOTE — NUR
Face sheet and  's orders faxed to [place] fax # [phone].

## 2019-03-06 NOTE — NUR
CM NOTE



PER ANA SUAZO PH# [phone], FOR TRANSPORTATION GOING TO [facility] 
AND [facility] [place],  TO USE [facility] PH# [phone] AND NO AUTHORIZATION IS NEEDED 
BECAUSE THE PATIENT HAS [facility] INSURANCE.  CYNTHIA DAVIS.

## 2019-03-06 NOTE — NUR
CHECKED ON PT, PT SLEEPING, NO DISTRESS NOTED, VS TAKEN WNL, CALL LIGHT WITHIN REACH, WILL 
CONTINUE TO MONITOR.

## 2019-03-06 NOTE — NUR
Spoke with Savita from [facility], AUTHORIZATION #[id number] for [facility]. Clinicals faxed to Jonelle  [phone].

## 2019-03-30 ENCOUNTER — HOSPITAL ENCOUNTER (INPATIENT)
Dept: HOSPITAL 26 - MED | Age: 54
LOS: 1 days | Discharge: NURSING, REHAB OR LONG-TERM CARE | DRG: 40 | End: 2019-03-31
Attending: INTERNAL MEDICINE | Admitting: INTERNAL MEDICINE
Payer: MEDICAID

## 2019-03-30 VITALS — SYSTOLIC BLOOD PRESSURE: 96 MMHG | DIASTOLIC BLOOD PRESSURE: 65 MMHG

## 2019-03-30 VITALS — DIASTOLIC BLOOD PRESSURE: 74 MMHG | SYSTOLIC BLOOD PRESSURE: 114 MMHG

## 2019-03-30 VITALS
HEIGHT: 66 IN | WEIGHT: 159.03 LBS | DIASTOLIC BLOOD PRESSURE: 64 MMHG | BODY MASS INDEX: 25.56 KG/M2 | SYSTOLIC BLOOD PRESSURE: 107 MMHG

## 2019-03-30 DIAGNOSIS — L25.9: ICD-10-CM

## 2019-03-30 DIAGNOSIS — F41.9: ICD-10-CM

## 2019-03-30 DIAGNOSIS — N39.0: ICD-10-CM

## 2019-03-30 DIAGNOSIS — E87.6: ICD-10-CM

## 2019-03-30 DIAGNOSIS — Z86.711: ICD-10-CM

## 2019-03-30 DIAGNOSIS — N18.6: ICD-10-CM

## 2019-03-30 DIAGNOSIS — R07.89: ICD-10-CM

## 2019-03-30 DIAGNOSIS — F63.9: ICD-10-CM

## 2019-03-30 DIAGNOSIS — Z79.899: ICD-10-CM

## 2019-03-30 DIAGNOSIS — Z79.01: ICD-10-CM

## 2019-03-30 DIAGNOSIS — G47.00: ICD-10-CM

## 2019-03-30 DIAGNOSIS — E11.22: ICD-10-CM

## 2019-03-30 DIAGNOSIS — G89.29: ICD-10-CM

## 2019-03-30 DIAGNOSIS — M19.90: ICD-10-CM

## 2019-03-30 DIAGNOSIS — G37.3: Primary | ICD-10-CM

## 2019-03-30 DIAGNOSIS — Z79.4: ICD-10-CM

## 2019-03-30 DIAGNOSIS — F25.9: ICD-10-CM

## 2019-03-30 DIAGNOSIS — I50.30: ICD-10-CM

## 2019-03-30 DIAGNOSIS — E78.5: ICD-10-CM

## 2019-03-30 DIAGNOSIS — Z99.2: ICD-10-CM

## 2019-03-30 DIAGNOSIS — I13.2: ICD-10-CM

## 2019-03-30 DIAGNOSIS — G82.20: ICD-10-CM

## 2019-03-30 DIAGNOSIS — D63.8: ICD-10-CM

## 2019-03-30 LAB
ALBUMIN FLD-MCNC: 3.7 G/DL (ref 3.4–5)
AMYLASE SERPL-CCNC: 77 U/L (ref 25–115)
ANION GAP SERPL CALCULATED.3IONS-SCNC: 14.1 MMOL/L (ref 8–16)
APPEARANCE UR: (no result)
AST SERPL-CCNC: 44 U/L (ref 15–37)
BASOPHILS # BLD AUTO: 0 K/UL (ref 0–0.22)
BASOPHILS NFR BLD AUTO: 0.5 % (ref 0–2)
BILIRUB SERPL-MCNC: 0.4 MG/DL (ref 0–1)
BILIRUB UR QL STRIP: NEGATIVE
BUN SERPL-MCNC: 54 MG/DL (ref 7–18)
CHLORIDE SERPL-SCNC: 100 MMOL/L (ref 98–107)
CO2 SERPL-SCNC: 28.3 MMOL/L (ref 21–32)
COLOR UR: YELLOW
CREAT SERPL-MCNC: 2.9 MG/DL (ref 0.7–1.3)
EOSINOPHIL # BLD AUTO: 0.9 K/UL (ref 0–0.4)
EOSINOPHIL NFR BLD AUTO: 15.9 % (ref 0–4)
ERYTHROCYTE [DISTWIDTH] IN BLOOD BY AUTOMATED COUNT: 12.9 % (ref 11.6–13.7)
GFR SERPL CREATININE-BSD FRML MDRD: 30 ML/MIN (ref 90–?)
GLUCOSE SERPL-MCNC: 117 MG/DL (ref 74–106)
GLUCOSE UR STRIP-MCNC: NEGATIVE MG/DL
HCT VFR BLD AUTO: 32.5 % (ref 36–52)
HGB BLD-MCNC: 10.6 G/DL (ref 12–18)
HGB UR QL STRIP: (no result)
IRON SERPL-MCNC: 42 UG/DL (ref 35–150)
KETONES TITR SERPL: NEGATIVE {TITER}
LEUKOCYTE ESTERASE UR QL STRIP: (no result)
LIPASE SERPL-CCNC: 196 U/L (ref 73–393)
LYMPHOCYTES # BLD AUTO: 1.4 K/UL (ref 2–11.5)
LYMPHOCYTES NFR BLD AUTO: 23.8 % (ref 20.5–51.1)
MAGNESIUM SERPL-MCNC: 2.2 MG/DL (ref 1.8–2.4)
MCH RBC QN AUTO: 28 PG (ref 27–31)
MCHC RBC AUTO-ENTMCNC: 32 G/DL (ref 33–37)
MCV RBC AUTO: 86.1 FL (ref 80–94)
MONOCYTES # BLD AUTO: 0.5 K/UL (ref 0.8–1)
MONOCYTES NFR BLD AUTO: 8.7 % (ref 1.7–9.3)
NEUTROPHILS # BLD AUTO: 2.9 K/UL (ref 1.8–7.7)
NEUTROPHILS NFR BLD AUTO: 51.1 % (ref 42.2–75.2)
NITRITE UR QL STRIP: POSITIVE
PH UR STRIP: 8 [PH] (ref 5–9)
PLATELET # BLD AUTO: 124 K/UL (ref 140–450)
POTASSIUM SERPL-SCNC: 3.4 MMOL/L (ref 3.5–5.1)
RBC # BLD AUTO: 3.78 MIL/UL (ref 4.2–6.1)
RBC #/AREA URNS HPF: (no result) /HPF (ref 0–5)
SODIUM SERPL-SCNC: 139 MMOL/L (ref 136–145)
TIBC SERPL-MCNC: 168 UG/DL (ref 250–450)
TSH SERPL DL<=0.05 MIU/L-ACNC: 1.72 UIU/ML (ref 0.34–3.74)
URATE SERPL-MCNC: 6.2 MG/DL (ref 2.6–7.2)
WBC # BLD AUTO: 5.8 K/UL (ref 4.8–10.8)
WBC,URINE: (no result) /HPF (ref 0–5)

## 2019-03-30 PROCEDURE — 5A1D70Z PERFORMANCE OF URINARY FILTRATION, INTERMITTENT, LESS THAN 6 HOURS PER DAY: ICD-10-PCS

## 2019-03-30 PROCEDURE — G0482 DRUG TEST DEF 15-21 CLASSES: HCPCS

## 2019-03-30 RX ADMIN — SENNOSIDES A AND B SCH MG: 8.6 TABLET, FILM COATED ORAL at 23:57

## 2019-03-30 RX ADMIN — OXYCODONE HYDROCHLORIDE AND ACETAMINOPHEN SCH MG: 500 TABLET ORAL at 23:57

## 2019-03-30 RX ADMIN — HYDROCODONE BITARTRATE AND ACETAMINOPHEN PRN TAB: 5; 325 TABLET ORAL at 18:01

## 2019-03-30 RX ADMIN — INSULIN LISPRO SCH UNITS: 100 INJECTION, SOLUTION INTRAVENOUS; SUBCUTANEOUS at 16:30

## 2019-03-30 RX ADMIN — INSULIN LISPRO SCH UNITS: 100 INJECTION, SOLUTION INTRAVENOUS; SUBCUTANEOUS at 21:00

## 2019-03-30 RX ADMIN — CALCIUM ACETATE SCH MG: 667 CAPSULE ORAL at 18:00

## 2019-03-30 NOTE — NUR
ENDORSED CARE TO DESEAN RN DAYSHIFT NURSE AT BEDSIDE FOR CONTINUITY OF CARE, PT RESTING WITH 
EYES CLOSED, PT IN STABLE CONDITION, ALL FALLS PRECAUTIONS IN PLACE.

## 2019-03-30 NOTE — NUR
Patient will be admitted to care of Dr. White. Admited to TELE.  Will go to 
room 117. Belongings list completed.  Report to Kindra Garner RN.

## 2019-03-30 NOTE — NUR
RECEIVED BEDSIDE REPORT FROM ER NURSE. PT STABLE, AWAKE, AND ALERT AND ORIENTED X4. NO SIGNS 
OF DISTRESS NOTED. NO REDNESS, SWELLING, OR INFLAMMATION NOTED ON IV SITE. BED ALARM ON, BED 
IN LOWEST POSITION. CALL BELL WITHIN REACH. SAFETY MEASURES IN PLACE. PLAN OF CARE REVIEWED.

## 2019-03-30 NOTE — NUR
54 YO M BIBA- [facility] D/T "WAIT WAS TOO LONG AT DIALYSIS CENTER". PT ALSO CO 7/10 
GENERALIZED BACK PAIN X 2 YEARS. DENIES RECENT TRAUMA OR INJURY.



-- COLOSTOMY BAG AND SUPRAPUBIC INDWELLING CATHETER NOTED.



PMH: HTN, DM, ESRD



PT POSITIONED FOR COMFORT. NON-AMBULATORY, SIDE RAIL UP X 2. BED IN LOWEST 
POSITION. HOB ELEVATED. VSS. NO APPARENT DISTRESS AT THIS TIME.

-------------------------------------------------------------------------------

Addendum: 03/30/19 at 1847 by [facility]

-------------------------------------------------------------------------------

WOUND TO COCCYX/SACRAL REGION NOTED. NO DRESSING PRESENT. PHOTO TAKEN.

## 2019-03-30 NOTE — NUR
DIALYSIS STARTED. KELSEY DIALYSIS NURSE POINTED OUT THAT SKIN UNDER PERMACATH LOOKS FLAKEY WITH 
OLD DRAINAGE. KELSEY SAID HE THOUGH IT LOOKED INFECTED. HE SENT A PIC VIA PHONE TO DR. IVERSON, 
NEPHROLOGIST. PT HAS NO C/O OF PAIN OR DISTRESS NOTED.

## 2019-03-30 NOTE — NUR
PT GIVEN ORDERED SENOKOT AND VITAMIN C TABS. INSULIN HUMALOG HELD DUE TO F/S IN NORMAL RANGE 
121. PT REFUSED HEPARIN IM FOR DVT PREVENTION.

## 2019-03-31 VITALS — DIASTOLIC BLOOD PRESSURE: 66 MMHG | SYSTOLIC BLOOD PRESSURE: 110 MMHG

## 2019-03-31 VITALS — SYSTOLIC BLOOD PRESSURE: 102 MMHG | DIASTOLIC BLOOD PRESSURE: 64 MMHG

## 2019-03-31 VITALS — SYSTOLIC BLOOD PRESSURE: 109 MMHG | DIASTOLIC BLOOD PRESSURE: 62 MMHG

## 2019-03-31 VITALS — DIASTOLIC BLOOD PRESSURE: 62 MMHG | SYSTOLIC BLOOD PRESSURE: 100 MMHG

## 2019-03-31 LAB
ALBUMIN FLD-MCNC: 3.5 G/DL (ref 3.4–5)
ANION GAP SERPL CALCULATED.3IONS-SCNC: 15.2 MMOL/L (ref 8–16)
AST SERPL-CCNC: 46 U/L (ref 15–37)
BASOPHILS # BLD AUTO: 0 K/UL (ref 0–0.22)
BASOPHILS NFR BLD AUTO: 0.8 % (ref 0–2)
BILIRUB SERPL-MCNC: 0.4 MG/DL (ref 0–1)
BUN SERPL-MCNC: 27 MG/DL (ref 7–18)
CHLORIDE SERPL-SCNC: 102 MMOL/L (ref 98–107)
CO2 SERPL-SCNC: 25.4 MMOL/L (ref 21–32)
CREAT SERPL-MCNC: 1.9 MG/DL (ref 0.7–1.3)
EOSINOPHIL # BLD AUTO: 0.8 K/UL (ref 0–0.4)
EOSINOPHIL NFR BLD AUTO: 16.7 % (ref 0–4)
ERYTHROCYTE [DISTWIDTH] IN BLOOD BY AUTOMATED COUNT: 12.7 % (ref 11.6–13.7)
GFR SERPL CREATININE-BSD FRML MDRD: 48 ML/MIN (ref 90–?)
GLUCOSE SERPL-MCNC: 92 MG/DL (ref 74–106)
HCT VFR BLD AUTO: 31.7 % (ref 36–52)
HGB BLD-MCNC: 10.4 G/DL (ref 12–18)
LYMPHOCYTES # BLD AUTO: 1.1 K/UL (ref 2–11.5)
LYMPHOCYTES NFR BLD AUTO: 22.4 % (ref 20.5–51.1)
MCH RBC QN AUTO: 28 PG (ref 27–31)
MCHC RBC AUTO-ENTMCNC: 33 G/DL (ref 33–37)
MCV RBC AUTO: 85.3 FL (ref 80–94)
MONOCYTES # BLD AUTO: 0.5 K/UL (ref 0.8–1)
MONOCYTES NFR BLD AUTO: 9.7 % (ref 1.7–9.3)
NEUTROPHILS # BLD AUTO: 2.4 K/UL (ref 1.8–7.7)
NEUTROPHILS NFR BLD AUTO: 50.4 % (ref 42.2–75.2)
PLATELET # BLD AUTO: 112 K/UL (ref 140–450)
POTASSIUM SERPL-SCNC: 3.6 MMOL/L (ref 3.5–5.1)
RBC # BLD AUTO: 3.72 MIL/UL (ref 4.2–6.1)
SODIUM SERPL-SCNC: 139 MMOL/L (ref 136–145)
WBC # BLD AUTO: 4.8 K/UL (ref 4.8–10.8)

## 2019-03-31 RX ADMIN — OXYCODONE HYDROCHLORIDE AND ACETAMINOPHEN SCH MG: 500 TABLET ORAL at 08:57

## 2019-03-31 RX ADMIN — HYDROCODONE BITARTRATE AND ACETAMINOPHEN PRN TAB: 5; 325 TABLET ORAL at 13:48

## 2019-03-31 RX ADMIN — CALCIUM ACETATE SCH MG: 667 CAPSULE ORAL at 08:56

## 2019-03-31 RX ADMIN — INSULIN LISPRO SCH UNITS: 100 INJECTION, SOLUTION INTRAVENOUS; SUBCUTANEOUS at 16:30

## 2019-03-31 RX ADMIN — INSULIN LISPRO SCH UNITS: 100 INJECTION, SOLUTION INTRAVENOUS; SUBCUTANEOUS at 07:30

## 2019-03-31 RX ADMIN — INSULIN LISPRO SCH UNITS: 100 INJECTION, SOLUTION INTRAVENOUS; SUBCUTANEOUS at 11:30

## 2019-03-31 RX ADMIN — CALCIUM ACETATE SCH MG: 667 CAPSULE ORAL at 13:49

## 2019-03-31 RX ADMIN — SENNOSIDES A AND B SCH MG: 8.6 TABLET, FILM COATED ORAL at 08:57

## 2019-03-31 NOTE — NUR
DIALYSIS COMPLETED. POST DIALYSIS V/S ARE AS FOLLOWS T 98.4 P 85 R 18 B/P 97/65 02 95% ON 
R/A AND P 85.

## 2019-03-31 NOTE — NUR
GAVE DISCHARGE INSTRUCTIONS, PT VERBALIZED UNDERSTANDING. IV REMOVED LUMEN INTACT. ID BAND 
REMOVE. DISCHARGE INSTRUCTIONS GIVEN TO TRANSPORT TEAM, TRANSPORT TEAM VERBALIZED 
UNDERSTANDING. PT PLACED ON GURNEY IN STABLE CONDITION.

## 2019-03-31 NOTE — NUR
AT 0245 [facility] PT AND WHY PT ADMITTED.AND 
HOW IS HE DOING.SHE SAID THEY DIDN'T KNOW PT IS HERE AND ADMITTED BECAUSE PT TRANSFERRED 
FROM DIALYSIS CENTER HERE AND NOBODY CALLED THEM FROM HD CENTER THAT THEY TRANSFERRED PT 
HERE.

## 2019-03-31 NOTE — NUR
RECEIVED BEDSIDE REPORT FROM NIGHT SHIFT NURSE FOR CONTINUITY OF CARE. PT IN STABLE 
CONDITION. RESPIRATIONS EVEN AND UNLABORED. IV INTACT AND PATENT. BED IN LOW POSITION. CALL 
LIGHT AT BEDSIDE. BED ALARM ON. SAFETY MEASURES IN PLACE. WILL CONTINUE TO MONITOR.

## 2019-03-31 NOTE — NUR
GAVE ORDERED DUE MEDICATIONS AT THIS TIME. PT TOLERATED WELL. WILL CONTINUE TO MONITOR. BED 
IN LOW POSITION. CALL LIGHT AT BEDSIDE. BED ALARM ON.

## 2020-03-12 ENCOUNTER — HOSPITAL ENCOUNTER (EMERGENCY)
Dept: HOSPITAL 26 - MED | Age: 55
LOS: 1 days | Discharge: HOME | End: 2020-03-13
Payer: MEDICAID

## 2020-03-12 VITALS — WEIGHT: 198 LBS | HEIGHT: 69 IN | BODY MASS INDEX: 29.33 KG/M2

## 2020-03-12 VITALS — DIASTOLIC BLOOD PRESSURE: 61 MMHG | SYSTOLIC BLOOD PRESSURE: 115 MMHG

## 2020-03-12 DIAGNOSIS — Z88.0: ICD-10-CM

## 2020-03-12 DIAGNOSIS — Z46.6: ICD-10-CM

## 2020-03-12 DIAGNOSIS — E11.22: ICD-10-CM

## 2020-03-12 DIAGNOSIS — N18.6: ICD-10-CM

## 2020-03-12 DIAGNOSIS — N39.0: Primary | ICD-10-CM

## 2020-03-12 DIAGNOSIS — I12.0: ICD-10-CM

## 2020-03-12 DIAGNOSIS — Z88.1: ICD-10-CM

## 2020-03-12 DIAGNOSIS — F20.9: ICD-10-CM

## 2020-03-13 ENCOUNTER — HOSPITAL ENCOUNTER (INPATIENT)
Dept: HOSPITAL 26 - MED | Age: 55
LOS: 3 days | Discharge: NURSING, REHAB OR LONG-TERM CARE | DRG: 466 | End: 2020-03-16
Attending: GENERAL PRACTICE | Admitting: GENERAL PRACTICE
Payer: MEDICAID

## 2020-03-13 VITALS — SYSTOLIC BLOOD PRESSURE: 137 MMHG | DIASTOLIC BLOOD PRESSURE: 58 MMHG

## 2020-03-13 VITALS — DIASTOLIC BLOOD PRESSURE: 50 MMHG | SYSTOLIC BLOOD PRESSURE: 107 MMHG

## 2020-03-13 VITALS — SYSTOLIC BLOOD PRESSURE: 133 MMHG | DIASTOLIC BLOOD PRESSURE: 67 MMHG

## 2020-03-13 VITALS — BODY MASS INDEX: 26.66 KG/M2 | HEIGHT: 69 IN | WEIGHT: 180 LBS

## 2020-03-13 VITALS — DIASTOLIC BLOOD PRESSURE: 62 MMHG | SYSTOLIC BLOOD PRESSURE: 131 MMHG

## 2020-03-13 DIAGNOSIS — I12.0: ICD-10-CM

## 2020-03-13 DIAGNOSIS — Z99.2: ICD-10-CM

## 2020-03-13 DIAGNOSIS — E11.22: ICD-10-CM

## 2020-03-13 DIAGNOSIS — Z86.73: ICD-10-CM

## 2020-03-13 DIAGNOSIS — G82.20: ICD-10-CM

## 2020-03-13 DIAGNOSIS — E11.52: ICD-10-CM

## 2020-03-13 DIAGNOSIS — Z86.718: ICD-10-CM

## 2020-03-13 DIAGNOSIS — G93.1: ICD-10-CM

## 2020-03-13 DIAGNOSIS — T82.41XA: Primary | ICD-10-CM

## 2020-03-13 DIAGNOSIS — N18.6: ICD-10-CM

## 2020-03-13 DIAGNOSIS — D63.8: ICD-10-CM

## 2020-03-13 DIAGNOSIS — I21.A1: ICD-10-CM
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
## 2020-03-13 DIAGNOSIS — F25.9: ICD-10-CM

## 2020-03-13 DIAGNOSIS — F41.9: ICD-10-CM

## 2020-03-13 DIAGNOSIS — L89.159: ICD-10-CM

## 2020-03-13 DIAGNOSIS — E78.5: ICD-10-CM

## 2020-03-13 DIAGNOSIS — Z93.3: ICD-10-CM

## 2020-03-13 DIAGNOSIS — I25.10: ICD-10-CM

## 2020-03-13 DIAGNOSIS — Z88.8: ICD-10-CM

## 2020-03-13 DIAGNOSIS — N17.0: ICD-10-CM

## 2020-03-13 LAB
ALBUMIN FLD-MCNC: 2.8 G/DL (ref 3.4–5)
ANION GAP SERPL CALCULATED.3IONS-SCNC: 10.9 MMOL/L (ref 8–16)
AST SERPL-CCNC: 26 U/L (ref 15–37)
BASOPHILS # BLD AUTO: 0 K/UL (ref 0–0.22)
BASOPHILS NFR BLD AUTO: 0.4 % (ref 0–2)
BILIRUB SERPL-MCNC: 0.4 MG/DL (ref 0–1)
BUN SERPL-MCNC: 49 MG/DL (ref 7–18)
CHLORIDE SERPL-SCNC: 99 MMOL/L (ref 98–107)
CO2 SERPL-SCNC: 29.8 MMOL/L (ref 21–32)
CREAT SERPL-MCNC: 2.3 MG/DL (ref 0.6–1.3)
EOSINOPHIL # BLD AUTO: 0.6 K/UL (ref 0–0.4)
EOSINOPHIL NFR BLD AUTO: 10.6 % (ref 0–4)
ERYTHROCYTE [DISTWIDTH] IN BLOOD BY AUTOMATED COUNT: 15.3 % (ref 11.6–13.7)
GFR SERPL CREATININE-BSD FRML MDRD: 38 ML/MIN (ref 90–?)
GLUCOSE SERPL-MCNC: 158 MG/DL (ref 74–106)
HCT VFR BLD AUTO: 36.3 % (ref 36–52)
HGB BLD-MCNC: 11.7 G/DL (ref 12–18)
IRON SERPL-MCNC: 20 UG/DL (ref 50–175)
LIPASE SERPL-CCNC: 217 U/L (ref 73–393)
LYMPHOCYTES # BLD AUTO: 0.8 K/UL (ref 2–11.5)
LYMPHOCYTES NFR BLD AUTO: 13.7 % (ref 20.5–51.1)
MAGNESIUM SERPL-MCNC: 2 MG/DL (ref 1.8–2.4)
MCH RBC QN AUTO: 29 PG (ref 27–31)
MCHC RBC AUTO-ENTMCNC: 32 G/DL (ref 33–37)
MCV RBC AUTO: 90.2 FL (ref 80–94)
MONOCYTES # BLD AUTO: 0.4 K/UL (ref 0.8–1)
MONOCYTES NFR BLD AUTO: 7.3 % (ref 1.7–9.3)
NEUTROPHILS # BLD AUTO: 4.1 K/UL (ref 1.8–7.7)
NEUTROPHILS NFR BLD AUTO: 68 % (ref 42.2–75.2)
PHOSPHATE SERPL-MCNC: 3.5 MG/DL (ref 2.5–4.9)
PLATELET # BLD AUTO: 97 K/UL (ref 140–450)
POTASSIUM SERPL-SCNC: 3.7 MMOL/L (ref 3.5–5.1)
PROTHROMBIN TIME: 11.6 SECS (ref 10.8–13.4)
RBC # BLD AUTO: 4.02 MIL/UL (ref 4.2–6.1)
SODIUM SERPL-SCNC: 136 MMOL/L (ref 136–145)
TIBC SERPL-MCNC: 137 UG/DL (ref 250–450)
TSH SERPL DL<=0.05 MIU/L-ACNC: 1.39 UIU/ML (ref 0.34–3.74)
WBC # BLD AUTO: 6 K/UL (ref 4.8–10.8)

## 2020-03-13 PROCEDURE — G0378 HOSPITAL OBSERVATION PER HR: HCPCS

## 2020-03-13 PROCEDURE — 99285 EMERGENCY DEPT VISIT HI MDM: CPT

## 2020-03-13 PROCEDURE — A6248 HYDROGEL DRSG GEL FILLER: HCPCS

## 2020-03-13 RX ADMIN — INSULIN LISPRO SCH UNITS: 100 INJECTION, SOLUTION INTRAVENOUS; SUBCUTANEOUS at 17:30

## 2020-03-13 RX ADMIN — INSULIN LISPRO SCH UNITS: 100 INJECTION, SOLUTION INTRAVENOUS; SUBCUTANEOUS at 21:00

## 2020-03-13 RX ADMIN — SODIUM CHLORIDE, PRESERVATIVE FREE SCH ML: 5 INJECTION INTRAVENOUS at 21:29

## 2020-03-13 RX ADMIN — Medication SCH DEV: at 21:36

## 2020-03-13 RX ADMIN — SENNOSIDES A AND B SCH MG: 8.6 TABLET, FILM COATED ORAL at 21:28

## 2020-03-13 RX ADMIN — OXYCODONE HYDROCHLORIDE AND ACETAMINOPHEN SCH MG: 500 TABLET ORAL at 21:28

## 2020-03-13 RX ADMIN — CALCIUM ACETATE SCH MG: 667 CAPSULE ORAL at 17:31

## 2020-03-13 NOTE — NUR
RECEIVED CRITICAL LAB FROM CHEMISTRY. TROPONIN 0.007. DR. CARNEY IS AWARE. SAFETY MEASURES 
IN PLACE. WILL CONTINUE TO MONITOR.

## 2020-03-13 NOTE — NUR
ERMD UNABLE TO REMOVE LEANN CATHETER TO RIGHT FEMUR. MILDLY SWOLLEN AT SITE, 
WITHOUT DRAINAGE, REDRESSED SITE, PER ERMD PT WILL BE ADMITTED FOR SURGICAL 
REMOVAL

## 2020-03-13 NOTE — NUR
RECEIVED BEDSIDE REPORT FROM AM SHIFT RN FOR PT'S CONTINUITY OF CARE, PT IS UNDER 
OBSERVATION. PT IS AAOX2, IS ON ROOM AIR, HAS LEFT HAND 24G SALINE LOCK, PT DENIES ANY PAIN. 
EXPLAINED TO PT THE NIGHT SHIFT ROUTINE, PT VERBALIZED UNDERSTANDING. SAFETY MEASURES, 
ISOLATION PRECAUTION IN PLACE, AND CALL LIGHT IS WITHIN REACH. WILL MONITOR PT THROUGHOUT 
SHIFT.

## 2020-03-13 NOTE — NUR
RECEIVED CALL FROM PEG MABRY FROM [facility] WILL CALL BACK TO AUTHORIZE 
ADMISSION [phone] 

X [phone]

## 2020-03-13 NOTE — NUR
Patient will be admitted to care of [facility]. Admited to MED SURG.  Will go to 
room 114. Belongings list completed.  Report to MICKEY PAINTING.

## 2020-03-13 NOTE — NUR
RECEIVED BEDSIDE REPORT FROM ED NURSE. PT RESTING IN BED UPON ARRIVAL. ABLE TO MAKE SOME 
NEEDS KNOWN. RESPIRATIONS EVEN AND UNLABORED WITH NO SOB OR RESPIRATORY DISTRESS. SKIN WARM 
AND DRY TO TOUCH. COLOSTOMY IS DRAINING SOFT, BROWN, STOOL. SUPRAPUBIC CATHETER IS DRAINING 
CLEAR, YELLOW URINE WITHOUT CLOGS OR SEDIMENTS. VITAL SIGNS UPON ARRIVAL: 107/50 BP, 18 RR, 
78 HR, 97.8 TEMP, AND 98% SPO2 ON ROOM AIR. MRSA SWAB COLLECTED. SAFETY MEASURES IN PLACE. 
WILL CONTINUE TO MONITOR.

## 2020-03-13 NOTE — NUR
ENDORSED AT BEDSIDE TO NIGHTSHIFT NURSE. PT RESTING IN BED UPON ARRIVAL. ABLE TO MAKE NEEDS 
KNOWN. RESPIRATIONS EVEN AND UNLABORED WITH NO SOB OR RESPIRATORY DISTRESS. SKIN WARM AND 
DRY TO TOUCH. SAFETY MEASURES IN PLACE. PT IS STABLE

## 2020-03-13 NOTE — NUR
ADMINISTERED SCHEDULED MEDICATIONS AS ORDERED. PT TOLERATED IT WELL. OBTAINED CONSENT FROM 
PT FOR PROCEDURE WITH DR. SIU IN THE A.M. DR. HARRIS AT BEDSIDE, EXPLAINED TO PT ABOUT 
THE SX AND CONSENT. BLOOD GLUCOSE CHECKED AND CHARTED. NO INSULIN COVERAGE NEEDED. PER MD, 
HOLD SCHEDULED HUMALOG 4 UNITS SUBQ AND USE TH SLIDING SCALE PROTOCOL. PT MADE COMFORTABLE, 
PT DENIES ANY PAIN. WILL CONTINUE TO MONITOR PT.

## 2020-03-13 NOTE — NUR
53 Y/O M[facility] 
OF LEANN CATHETER TO RIGHT FEMUR.  8/10 PAIN. PT ALSO HAS COLOSTOMY BAG, AND 
SUPRAPUBIC HANSON.  PT WAS HERE IN THE ER YESTERDAY.  WOUND ASSESSMENT, PT WAS 
D/C'D WITH ORAL ANTIBIOTICS AND WOUND CARE.

## 2020-03-13 NOTE — NUR
WOUND CARE EVALUATION NOTE:

REASON FOR EVALUATION: SACRALCOCCYX WOUNDS

SKIN ASSESSMENT DONE WITH PRIMARY RN WITH THIS 54 Y/O MALE PT ADMITTED FROM SNF TO [facility] WITH 
INITIAL DX INFECTED LEANN CATH. PAST MEDICAL HX: ESRD ON HD, SUPRAPUBIC CATH, COLOSTOMY, 
CVA, DVT, HTN, HLD, PAD, DIABETES, CEREBRAL INFARCTION, ANEMIA, PRESSURE ULCER AT THE SACRAL 
AREA, CAD, PARAPLEGIA, AND ENCEPHALOPATHY. PT. ADMITTED THIS TIME WITH STAGE 4 PRESSURE 
ULCERS TO SACRALCOCCYX AND LEFT HEEL PRESSURE ULCERS. PT IS AWAKE. SKIN IS WARM AND DRY, BLE 
NO HAIR GROWTH, MULTIPLE SCARS AND DRY SCABS, SKIN INTACT, NO EDEMA.  DORSAL PEDAL PULSES 
PRESENT AND NORMAL. FUNGAL NAILS X 10 TOES. BILATERAL HEELS THIN CALLUSES. PLAN OF CARE 
DISCUSSED WITH PRIMARY RN. 



INTEGUMENTARY:

-LLQ ABDOMEN COLOSTOMY FUNCTIONING WITH MODERATE AMOUNT SOFT BROWN STOOL OUTPUT.

-SUPRAPUBIC SITE, STOMA RED AND MOIST, NO ODOR, RANJEET-STOMA SKIN INTACT. 

-PRESSURE INJURIES STAGE 4 TO SACRALCOCCYX 6X4X0.5 CM WOUND BED RED, 100% GRANULATING 
TISSUE, MOIST, NO ODOR, RANJEET-WOUND SKIN INTACT SURROUNDING REDNESS WITH HEALED SCARS 

-LEFT HEEL PRESSURE ULCER STAGE4, 4X3XX0.3 CM, WOUND BED ARE RED, 100% GRANULATING TISSUE, 
AND MOIST, NO ODOR, PERIWOUND SKIN INTACT WITH THIN CALLUSES

-TINEA PEDIS ULCERATION TO RIGHT TOES INTERSPACES

-DRY SCABS TO LOWER EXTREMITIES, SKIN INTACT.



RECOMMENDATIONS:

-CLEANSE SACRALCOCCYX AND LEFT HEEL WITH WOUND CARE SOLUTION, PAT DRY, APPLY HYDROGEL AND 
COVER WITH COMPOSITE (ISLAND) DRESSING QD AND PRN IF SOILING

-PAINT BLE SCABS WITH BETADINE SOLUTION BID AND CLAUDIO

-APPLY SOAKED BETADINE 4X4 TO RIGHT TOES INTERSPACES BID 

-OFFLOAD BILATERAL HEELS BY PLACING PILLOWS UNDER CALVES UNLESS OTHERWISE CONTRAINDICATED

 -PRESSURE REDISTRIBUTION SURFACE THERAPY

-TURN AND REPOSITION Q2H, OFFLOAD SACRALCOCCYX AND BUTTOCKS BY TURNING RIGHT AND LEFT

-CONTINUE TO FOLLOW RD RECOMMENDATIONS



ALL ABOVE RECOMMENDATIONS DISCUSSED WITH PRIMARY RN.

WILL FOLLOW UP PT Q7-10 DAYS. PLEASE CONTACT WOUND CARE NURSE FOR ANY QUESTION AND CHANGE OF 
WOUND CONDITION

## 2020-03-14 VITALS — SYSTOLIC BLOOD PRESSURE: 116 MMHG | DIASTOLIC BLOOD PRESSURE: 58 MMHG

## 2020-03-14 VITALS — SYSTOLIC BLOOD PRESSURE: 116 MMHG | DIASTOLIC BLOOD PRESSURE: 65 MMHG

## 2020-03-14 LAB
ANION GAP SERPL CALCULATED.3IONS-SCNC: 14.7 MMOL/L (ref 8–16)
BARBITURATES UR QL SCN: NEGATIVE NG/ML
BASOPHILS # BLD AUTO: 0 K/UL (ref 0–0.22)
BASOPHILS NFR BLD AUTO: 0.5 % (ref 0–2)
BENZODIAZ UR QL SCN: POSITIVE NG/ML
BUN SERPL-MCNC: 49 MG/DL (ref 7–18)
BZE UR QL SCN: NEGATIVE NG/ML
CANNABINOIDS UR QL SCN: NEGATIVE NG/ML
CHLORIDE SERPL-SCNC: 102 MMOL/L (ref 98–107)
CO2 SERPL-SCNC: 24.6 MMOL/L (ref 21–32)
CREAT SERPL-MCNC: 2.5 MG/DL (ref 0.6–1.3)
EOSINOPHIL # BLD AUTO: 0.7 K/UL (ref 0–0.4)
EOSINOPHIL NFR BLD AUTO: 11.2 % (ref 0–4)
ERYTHROCYTE [DISTWIDTH] IN BLOOD BY AUTOMATED COUNT: 15.6 % (ref 11.6–13.7)
FERRITIN SERPL-MCNC: 192 NG/ML (ref 30–400)
FOLATE SERPL-MCNC: > 20 NG/ML (ref 3–?)
GFR SERPL CREATININE-BSD FRML MDRD: 35 ML/MIN (ref 90–?)
GLUCOSE SERPL-MCNC: 127 MG/DL (ref 74–106)
HCT VFR BLD AUTO: 39.8 % (ref 36–52)
HGB BLD-MCNC: 12.5 G/DL (ref 12–18)
LYMPHOCYTES # BLD AUTO: 1.3 K/UL (ref 2–11.5)
LYMPHOCYTES NFR BLD AUTO: 21.1 % (ref 20.5–51.1)
MAGNESIUM SERPL-MCNC: 2 MG/DL (ref 1.8–2.4)
MCH RBC QN AUTO: 29 PG (ref 27–31)
MCHC RBC AUTO-ENTMCNC: 32 G/DL (ref 33–37)
MCV RBC AUTO: 92.4 FL (ref 80–94)
MONOCYTES # BLD AUTO: 0.4 K/UL (ref 0.8–1)
MONOCYTES NFR BLD AUTO: 6 % (ref 1.7–9.3)
NEUTROPHILS # BLD AUTO: 3.9 K/UL (ref 1.8–7.7)
NEUTROPHILS NFR BLD AUTO: 61.2 % (ref 42.2–75.2)
OPIATES UR QL SCN: NEGATIVE NG/ML
PCP UR QL SCN: NEGATIVE NG/ML
PHOSPHATE SERPL-MCNC: 5.3 MG/DL (ref 2.5–4.9)
PLATELET # BLD AUTO: 91 K/UL (ref 140–450)
POTASSIUM SERPL-SCNC: 4.3 MMOL/L (ref 3.5–5.1)
RBC # BLD AUTO: 4.3 MIL/UL (ref 4.2–6.1)
SODIUM SERPL-SCNC: 137 MMOL/L (ref 136–145)
VIT B12 SERPL-MCNC: 1056 PG/ML (ref 232–1245)
WBC # BLD AUTO: 6.4 K/UL (ref 4.8–10.8)

## 2020-03-14 PROCEDURE — 5A1D70Z PERFORMANCE OF URINARY FILTRATION, INTERMITTENT, LESS THAN 6 HOURS PER DAY: ICD-10-PCS | Performed by: GENERAL PRACTICE

## 2020-03-14 PROCEDURE — 0JPV3XZ REMOVAL OF TUNNELED VASCULAR ACCESS DEVICE FROM UPPER EXTREMITY SUBCUTANEOUS TISSUE AND FASCIA, PERCUTANEOUS APPROACH: ICD-10-PCS | Performed by: SURGERY

## 2020-03-14 PROCEDURE — 0JB70ZZ EXCISION OF BACK SUBCUTANEOUS TISSUE AND FASCIA, OPEN APPROACH: ICD-10-PCS | Performed by: SURGERY

## 2020-03-14 RX ADMIN — SENNOSIDES A AND B SCH MG: 8.6 TABLET, FILM COATED ORAL at 09:49

## 2020-03-14 RX ADMIN — OXYCODONE HYDROCHLORIDE AND ACETAMINOPHEN SCH MG: 500 TABLET ORAL at 21:47

## 2020-03-14 RX ADMIN — Medication SCH MG: at 09:50

## 2020-03-14 RX ADMIN — CALCIUM ACETATE SCH MG: 667 CAPSULE ORAL at 12:00

## 2020-03-14 RX ADMIN — CALCIUM ACETATE SCH MG: 667 CAPSULE ORAL at 09:45

## 2020-03-14 RX ADMIN — SODIUM CHLORIDE, PRESERVATIVE FREE SCH ML: 5 INJECTION INTRAVENOUS at 05:32

## 2020-03-14 RX ADMIN — INSULIN LISPRO SCH UNITS: 100 INJECTION, SOLUTION INTRAVENOUS; SUBCUTANEOUS at 16:30

## 2020-03-14 RX ADMIN — OXYCODONE HYDROCHLORIDE AND ACETAMINOPHEN SCH MG: 500 TABLET ORAL at 09:49

## 2020-03-14 RX ADMIN — FERROUS SULFATE TAB 325 MG (65 MG ELEMENTAL FE) SCH MG: 325 (65 FE) TAB at 09:47

## 2020-03-14 RX ADMIN — Medication SCH DEV: at 20:24

## 2020-03-14 RX ADMIN — ATORVASTATIN CALCIUM SCH MG: 20 TABLET, FILM COATED ORAL at 09:48

## 2020-03-14 RX ADMIN — Medication SCH GEL: at 13:33

## 2020-03-14 RX ADMIN — INSULIN LISPRO SCH UNITS: 100 INJECTION, SOLUTION INTRAVENOUS; SUBCUTANEOUS at 07:30

## 2020-03-14 RX ADMIN — Medication SCH DEV: at 16:52

## 2020-03-14 RX ADMIN — SENNOSIDES A AND B SCH MG: 8.6 TABLET, FILM COATED ORAL at 21:47

## 2020-03-14 RX ADMIN — Medication SCH TAB: at 09:48

## 2020-03-14 RX ADMIN — INSULIN LISPRO SCH UNITS: 100 INJECTION, SOLUTION INTRAVENOUS; SUBCUTANEOUS at 11:30

## 2020-03-14 RX ADMIN — Medication SCH DEV: at 12:12

## 2020-03-14 RX ADMIN — SODIUM CHLORIDE, PRESERVATIVE FREE SCH ML: 5 INJECTION INTRAVENOUS at 20:24

## 2020-03-14 RX ADMIN — DOCUSATE SODIUM SCH MG: 100 CAPSULE, LIQUID FILLED ORAL at 09:46

## 2020-03-14 RX ADMIN — INSULIN LISPRO PRN UNITS: 100 INJECTION, SOLUTION INTRAVENOUS; SUBCUTANEOUS at 21:09

## 2020-03-14 RX ADMIN — CALCIUM ACETATE SCH MG: 667 CAPSULE ORAL at 17:29

## 2020-03-14 RX ADMIN — INSULIN LISPRO SCH UNITS: 100 INJECTION, SOLUTION INTRAVENOUS; SUBCUTANEOUS at 21:00

## 2020-03-14 RX ADMIN — SODIUM CHLORIDE, PRESERVATIVE FREE SCH ML: 5 INJECTION INTRAVENOUS at 13:31

## 2020-03-14 RX ADMIN — Medication SCH DEV: at 07:30

## 2020-03-14 NOTE — NUR
PT RESTING IN BED UPON ARRIVAL. ABLE TO MAKE NEEDS KNOWN. RESPIRATIONS EVEN AND UNLABORED 
WITH NO SOB OR RESPIRATORY DISTRESS. SKIN WARM AND DRY TO TOUCH. SAFETY MEASURES IN PLACE. 
WILL CONTINUE TO MONITOR

## 2020-03-14 NOTE — NUR
ADMINISTERED SCHEDULED NS IVP, IV SITE PATENT, INTACT, AND ASYMPTOMATIC. PT DENIES ANY PAIN. 
WILL CONTINUE TO MONITOR PT.

## 2020-03-14 NOTE — NUR
PT BLOOD SUGAR . NO INSULIN COVERAGE NEEDED AT THIS TIME. SAFETY MEASURES IN PLACE. 
WILL CONTINUE TO MONITOR

## 2020-03-14 NOTE — NUR
DC PLANNIN YRS OLD MALE PATIENT WAS ADMITTED FROM [facility] WITH A DX OF INFECTED LEANN 
CATHETER. PT HAS A HX OF ESRD ON HD , TRANSVERSE MYELITIS, CAD, PARAPLEGIC LEFT SUBCLAVIAN 
DVT SCHIZOAFFECTIVE DISORDER AND ANXIETY. UNABLE TO REMOVE THE OLD HD CATHETER AT ER AND PT 
WAS ADMITTED FOR SURGICAL REMOVAL OF THE DIALYSIS TUNNELED CATHETER. SEEN BY NEPHROLOGIST DR GARCIA /SHABANA  AND RECOMMENDED THE OLD CATHETER TO BE REMOVED. CONSULTED WITH DR GARCIA SURGEON 
. SEEN BY DR SIU AND PERFORMED DEBRIDEMENT OF SACROCOCCYX DECUBITUS ULCER AND REMOVED RT 
FEMORAL TUNNELLED DIALYSIS CATHETER. AWAITING FOR THE NEPHROLOGIST RECOMMENDATION. DC PLAN 
TO GO BACK TO [facility] WHEN STABLE CM TO FOLLOW. 

-------------------------------------------------------------------------------

Addendum: 20 at 1146 by Susie Simon 

-------------------------------------------------------------------------------

DC PLANNING:

PT HAS A DC ORDER AFTER HEMODIALYSIS FAXED ALL THE PAPERWORK TO [facility] AND 
CALLED THE LIAISON KYA LEFT A MESSAGE. CM TO FOLLOW 

-------------------------------------------------------------------------------

Addendum: 20 at 1302 by Susie Simon 

-------------------------------------------------------------------------------

OTTONIEL PLANNING 

 CALLED [facility] CLARIFIED PT HAS A CHAIR TIME MWF AT 12:30 , 
STILL WAITING FROM [facility] TO GET THE ROOM NUMBER 

-------------------------------------------------------------------------------

Addendum: 20 at 1619 by Abram Man SS

-------------------------------------------------------------------------------

NANCY contacted Iva from [facility] [phone]. Per Iva, patient will return to room 
508 bed B under Dr. Marko Carnes. NANCY contacted [facility] department [phone] to arrange 
transportation from [facility] to [facility]. NANCY spoke with 
Gilmer. Gilmer stated that [employer] [facility] would be arriving at 6:30PM and provided reference 
#[id number]. Nurse was notified. No further needs identified. Detail Level: Simple Instructions: This plan will send the code FBSD to the PM system.  DO NOT or CHANGE the price. Price (Do Not Change): 0.00

## 2020-03-14 NOTE — NUR
PATIENT COMPLETED HEMODIALYSIS WITH 2L OUT. PATIENT IS IN STABLE CONDITION. NO C/O PAIN. NO 
S/SX ACUTE DISTRESS. REPOSITIONED FOR COMFORT. CALL LIGHT WITHIN REACH. WILL CONTINUE TO 
MONITOR.

## 2020-03-14 NOTE — NUR
PT YELLING, CALLING OUT "MAMA", CHECKED WITH THE PT, REQUESTED FOR A TOWEL UNDER HIS HEAD 
FOR MORE COMFORT. PT DENIES ANY PAIN OR OTHER DISCOMFORT. PT MADE COMFORTABLE, SAFETY 
MEASURES IN PLACE, AND CALL LIGHT IS WITHIN REACH.

## 2020-03-14 NOTE — NUR
PT BLOOD SUGAR IS 98. NO INSULIN COVERAGE NEEDED. SAFETY MEASURES IN PLACE. WILL CONTINUE TO 
MONITOR

## 2020-03-14 NOTE — NUR
ENDORSED AT BEDSIDE TO NIGHTSHIFT NURSE. PT RESTING IN BED UPON ARRIVAL. ABLE TO MAKE NEEDS 
KNOWN. RESPIRATIONS EVEN AND UNLABORED WITH NO SOB OR RESPIRATORY DISTRESS. SKIN WARM AND 
DRY TO TOUCH. SAFETY MEASURES IN PLACE. PT STABLE

## 2020-03-14 NOTE — NUR
PT REQUESTED AND PROVIDED FOR SOMETHING TO DRINK, INFORMED PT THAT HE WILL BE NPO AFTER 
MIDNIGHT FOR SCHEDULED PROCEDURE WITH DR. SIU IN A.M. PT VERBALIZED UNDERSTANDING. PT MADE 
COMFORTABLE. WILL MONITOR PT THROUGHOUT SHIFT.

## 2020-03-14 NOTE — NUR
RECEIVED PATIENT FROM AM SHIFT NURSE IN STABLE CONDITION FOR CONTINUITY OF CARE. 
RESPIRATIONS EVEN, UNLABORED. PATIENT CURRENTLY RECEIVING DIALYSIS. SALINE LOCK TO LEFT HAND 
24G PATENT/INTACT. SUPRAPUBIC CATHETER PATENT, CLEAR YELLOW URINE NOTED IN DRAINAGE BAG. NO 
C/O PAIN. NO S/SX ACUTE DISTRESS. ISOLATION PRECAUTIONS OBSERVED BY ALL STAFF. SAFETY 
PRECAUTIONS IN PLACE. CALL LIGHT WITHIN REACH. WILL CONTINUE TO MONITOR.

## 2020-03-14 NOTE — NUR
MADE ROUNDS. PATIENT ASLEEP AND IN STABLE CONDITION. NO C/O PAIN. NO S/SX ACUTE DISTRESS. 
CALL LIGHT WITHIN REACH. WILL CONTINUE TO MONITOR.

## 2020-03-14 NOTE — NUR
DR. SIU PERFORMED AT BEDSIDE, REMOVAL OF INFECTED LEANN CATH AS WELL AS DEBRIDEMENT OF 
SACRAL ULCER. SAFETY MEASURES IN PLACE. WILL CONTINUE TO MONITOR

## 2020-03-14 NOTE — NUR
HOURLY ROUNDING. PT RESTING IN BED UPON ARRIVAL. ABLE TO MAKE NEEDS KNOWN. RESPIRATIONS EVEN 
AND UNLABORED WITH NO SOB OR RESPIRATORY DISTRESS. SKIN WARM AND DRY TO TOUCH. SAFETY 
MEASURES IN PLACE. WILL CONTINUE TO MONITOR

## 2020-03-14 NOTE — NUR
DIALYSIS NURSE TRISTON IS HERE. REPORT WAS GIVEN AS WELL AS LABS. PT WILL BE RECEIVING HD VIA 
AV GRAFT VIA LEFT THIGH. SAFETY MEASURES IN PLACE. WILL CONTINUE TO MONITOR

-------------------------------------------------------------------------------

Addendum: 03/14/20 at 1701 by Angeles Gilmore RN

-------------------------------------------------------------------------------

WRONG ENTRY

## 2020-03-14 NOTE — NUR
WOUND CARE PERFORMED AS PRESCRIBED PER WOUND CARE ORDER. PT TOLERATED WELL. SAFETY MEASURES 
IN PLACE. WILL CONTINUE TO MONITOR

## 2020-03-15 VITALS — DIASTOLIC BLOOD PRESSURE: 53 MMHG | SYSTOLIC BLOOD PRESSURE: 90 MMHG

## 2020-03-15 VITALS — DIASTOLIC BLOOD PRESSURE: 60 MMHG | SYSTOLIC BLOOD PRESSURE: 112 MMHG

## 2020-03-15 VITALS — DIASTOLIC BLOOD PRESSURE: 55 MMHG | SYSTOLIC BLOOD PRESSURE: 121 MMHG

## 2020-03-15 LAB
ANION GAP SERPL CALCULATED.3IONS-SCNC: 11.7 MMOL/L (ref 8–16)
APPEARANCE UR: (no result)
BASOPHILS # BLD AUTO: 0 K/UL (ref 0–0.22)
BASOPHILS NFR BLD AUTO: 0.1 % (ref 0–2)
BILIRUB UR QL STRIP: NEGATIVE
BUN SERPL-MCNC: 28 MG/DL (ref 7–18)
CHLORIDE SERPL-SCNC: 100 MMOL/L (ref 98–107)
CHOLEST/HDLC SERPL: 2.5 {RATIO} (ref 1–4.5)
CO2 SERPL-SCNC: 31.4 MMOL/L (ref 21–32)
COLOR UR: YELLOW
CREAT SERPL-MCNC: 1.5 MG/DL (ref 0.6–1.3)
EOSINOPHIL # BLD AUTO: 0.5 K/UL (ref 0–0.4)
EOSINOPHIL NFR BLD AUTO: 7.6 % (ref 0–4)
ERYTHROCYTE [DISTWIDTH] IN BLOOD BY AUTOMATED COUNT: 15.4 % (ref 11.6–13.7)
GFR SERPL CREATININE-BSD FRML MDRD: 63 ML/MIN (ref 90–?)
GLUCOSE SERPL-MCNC: 82 MG/DL (ref 74–106)
GLUCOSE UR STRIP-MCNC: NEGATIVE MG/DL
HCT VFR BLD AUTO: 42 % (ref 36–52)
HDLC SERPL-MCNC: 33 MG/DL (ref 40–60)
HGB BLD-MCNC: 13.6 G/DL (ref 12–18)
HGB UR QL STRIP: (no result)
LDLC SERPL CALC-MCNC: 43 MG/DL (ref 60–100)
LEUKOCYTE ESTERASE UR QL STRIP: (no result)
LYMPHOCYTES # BLD AUTO: 0.8 K/UL (ref 2–11.5)
LYMPHOCYTES NFR BLD AUTO: 12.6 % (ref 20.5–51.1)
MAGNESIUM SERPL-MCNC: 1.9 MG/DL (ref 1.8–2.4)
MCH RBC QN AUTO: 30 PG (ref 27–31)
MCHC RBC AUTO-ENTMCNC: 32 G/DL (ref 33–37)
MCV RBC AUTO: 91.7 FL (ref 80–94)
MONOCYTES # BLD AUTO: 0.4 K/UL (ref 0.8–1)
MONOCYTES NFR BLD AUTO: 6.8 % (ref 1.7–9.3)
NEUTROPHILS # BLD AUTO: 4.7 K/UL (ref 1.8–7.7)
NEUTROPHILS NFR BLD AUTO: 72.9 % (ref 42.2–75.2)
NITRITE UR QL STRIP: NEGATIVE
PH UR STRIP: 8 [PH] (ref 5–9)
PHOSPHATE SERPL-MCNC: 4.1 MG/DL (ref 2.5–4.9)
PLATELET # BLD AUTO: 102 K/UL (ref 140–450)
POTASSIUM SERPL-SCNC: 4.1 MMOL/L (ref 3.5–5.1)
RBC # BLD AUTO: 4.58 MIL/UL (ref 4.2–6.1)
RBC #/AREA URNS HPF: (no result) /HPF (ref 0–5)
SODIUM SERPL-SCNC: 139 MMOL/L (ref 136–145)
TRIGL SERPL-MCNC: 25 MG/DL (ref 30–150)
WBC # BLD AUTO: 6.5 K/UL (ref 4.8–10.8)
WBC,URINE: (no result) /HPF (ref 0–5)

## 2020-03-15 RX ADMIN — Medication SCH GEL: at 13:22

## 2020-03-15 RX ADMIN — SODIUM CHLORIDE, PRESERVATIVE FREE SCH ML: 5 INJECTION INTRAVENOUS at 13:22

## 2020-03-15 RX ADMIN — CALCIUM ACETATE SCH MG: 667 CAPSULE ORAL at 12:18

## 2020-03-15 RX ADMIN — Medication SCH MG: at 09:28

## 2020-03-15 RX ADMIN — SODIUM CHLORIDE, PRESERVATIVE FREE SCH ML: 5 INJECTION INTRAVENOUS at 21:19

## 2020-03-15 RX ADMIN — SENNOSIDES A AND B SCH MG: 8.6 TABLET, FILM COATED ORAL at 21:17

## 2020-03-15 RX ADMIN — FERROUS SULFATE TAB 325 MG (65 MG ELEMENTAL FE) SCH MG: 325 (65 FE) TAB at 10:06

## 2020-03-15 RX ADMIN — Medication SCH DEV: at 16:37

## 2020-03-15 RX ADMIN — INSULIN LISPRO SCH UNITS: 100 INJECTION, SOLUTION INTRAVENOUS; SUBCUTANEOUS at 06:37

## 2020-03-15 RX ADMIN — DOCUSATE SODIUM SCH MG: 100 CAPSULE, LIQUID FILLED ORAL at 09:29

## 2020-03-15 RX ADMIN — INSULIN LISPRO SCH UNITS: 100 INJECTION, SOLUTION INTRAVENOUS; SUBCUTANEOUS at 11:30

## 2020-03-15 RX ADMIN — SENNOSIDES A AND B SCH MG: 8.6 TABLET, FILM COATED ORAL at 09:29

## 2020-03-15 RX ADMIN — OXYCODONE HYDROCHLORIDE AND ACETAMINOPHEN SCH MG: 500 TABLET ORAL at 21:17

## 2020-03-15 RX ADMIN — Medication SCH TAB: at 09:31

## 2020-03-15 RX ADMIN — APIXABAN SCH MG: 2.5 TABLET, FILM COATED ORAL at 21:16

## 2020-03-15 RX ADMIN — OXYCODONE HYDROCHLORIDE AND ACETAMINOPHEN SCH MG: 500 TABLET ORAL at 10:07

## 2020-03-15 RX ADMIN — ATORVASTATIN CALCIUM SCH MG: 20 TABLET, FILM COATED ORAL at 10:06

## 2020-03-15 RX ADMIN — INSULIN LISPRO SCH UNITS: 100 INJECTION, SOLUTION INTRAVENOUS; SUBCUTANEOUS at 21:00

## 2020-03-15 RX ADMIN — CALCIUM ACETATE SCH MG: 667 CAPSULE ORAL at 09:30

## 2020-03-15 RX ADMIN — CALCIUM ACETATE SCH MG: 667 CAPSULE ORAL at 16:37

## 2020-03-15 RX ADMIN — Medication SCH DEV: at 21:19

## 2020-03-15 RX ADMIN — INSULIN LISPRO SCH UNITS: 100 INJECTION, SOLUTION INTRAVENOUS; SUBCUTANEOUS at 16:30

## 2020-03-15 RX ADMIN — Medication SCH DEV: at 12:15

## 2020-03-15 RX ADMIN — Medication SCH DEV: at 06:37

## 2020-03-15 RX ADMIN — SODIUM CHLORIDE, PRESERVATIVE FREE SCH ML: 5 INJECTION INTRAVENOUS at 05:04

## 2020-03-15 RX ADMIN — INSULIN LISPRO PRN UNITS: 100 INJECTION, SOLUTION INTRAVENOUS; SUBCUTANEOUS at 21:15

## 2020-03-15 NOTE — NUR
PATIENT BLOOD SUGAR 165. ACCORDING TO DAY SHIFT NURSE DON'T ADMINISTER SCHEDULED HUMALOG 
4UNITS PER DOCTORS ORDER. HUMALOG 4UNITS NOT GIVEN INSTEAD GAVE 2 UNITS OF HUMALOG PER 
SLIDING SCALE PROTOCOL. WILL CONTINUE TO MONITOR.

## 2020-03-15 NOTE — NUR
(03/15/20) RD INITIAL ASSESSMENT COMPLETED

PLEASE REFER TO NUTRITION ASSESSMENT UNDER CARE ACTIVITY FOR ESTIMATED NUTRITIONAL NEEDS. 



RD RECOMMENDATIONS:

1. RECOMMED CONTINUE ON CCHO 60GM, RENAL, MECHANICAL SOFT DIET AS TOLERATED. 

2. CONSULT RDN PRN. 

3. RD WILL F/U 3-5 DAYS; MODERATE RISK. 



REN SANCHEZ, MS, RDN

## 2020-03-15 NOTE — NUR
RECEIVED BEDSIDE REPORT FROM NIGHTSHIFT NURSE. PT RESTING IN BED. ABLE TO MAKE NEEDS KNOWN. 
RESPIRATIONS EVEN AND UNLABORED WITH NO SOB OR RESPIRATORY DISTRESS. SKIN WARM AND DRY TO 
TOUCH. IV SITE IN LEFT HAND 24G IS CLEAN, DRY, AND INTACT. SAFETY MEASURES IN PLACE. WILL 
CONTINUE TO MONITOR

## 2020-03-15 NOTE — NUR
PATIENT HAS BEEN SCREENED AND CATEGORIZED AS HIGH NUTRITION RISK. PATIENT WILL BE SEEN 
WITHIN 1-2 DAYS OF ADMISSION.



3/14/20  3/15/20



SONALI TAVAREZ RD

## 2020-03-15 NOTE — NUR
MADE ROUNDS. PATIENT IS ASLEEP AND IN STABLE CONDITION. NO C/O PAIN. NO S/SX ACUTE DISTRESS. 
CALL LIGHT WITHIN REACH. WILL CONTINUE TO MONITOR.

## 2020-03-15 NOTE — NUR
ENDORSED AT BEDSIDE WITH NIGHTSHIFT NURSE. PT RESTING IN BED. ABLE TO MAKE NEEDS KNOWN. 
RESPIRATIONS EVEN AND UNLABORED WITH NO SOB OR RESPIRATORY DISTRESS. SKIN WARM AND DRY TO 
TOUCH. SAFETY MEASURES IN PLACE. PT IS STABLE

## 2020-03-15 NOTE — NUR
HOURLY ROUNDING. PT RESTING IN BED. ABLE TO MAKE NEEDS KNOWN. RESPIRATIONS EVEN AND 
UNLABORED WITH NO SOB OR RESPIRATORY DISTRESS. SKIN WARM AND DRY TO TOUCH. SAFETY MEASURES 
IN PLACE. WILL CONTINUE TO MONITOR

## 2020-03-15 NOTE — NUR
PT EATING DINNER VIA CNA. NO DISTRESS NOTED. NO COMPLAINTS OR CONCERNS AT THIS TIME. SAFETY 
MEASURES IN PLACE. WILL CONTINUE TO MONITOR

## 2020-03-15 NOTE — NUR
RECEIVED PATIENT FROM DAY SHIFT NURSE. PATIENT IS AWAKE AND COOPERATIVE. RESPIRATION EVEN 
UNLABORED ON ROOM AIR. NO DISTRESS NOTED. SKIN IS WARM AND DRY. IV PATENT AND INTACT. SALINE 
LOCK. SUPRAPUBIC CATHETER PATENT, CLEAR YELLOW URINE NOTED IN DRAINAGE BAG. COLOSTOMY BAG IN 
PLACE AND INTACT. ALL SAFETY MEASURE PRECAUTIONS IN PLACE. BED IS AT LOW POSITION CALL LIGHT 
WITHIN REACH. WILL CONTINUE TO MONITOR.

## 2020-03-15 NOTE — NUR
PT REFUSED TO BE TURNED AND REPOSITIONED PER CNA. PT STARTED YELLING "MAMA" WHEN THE CNA 
LEFT HIM ON HIS SIDE. PT WOULD NOT STOP SCREAMING "MAMA" UNTIL HE WAS REPOSITIONED ON HIS 
BACK. INSTRUCTED PT THE IMPORTANCE OF REPOSITIONING. PT INSISTED ON STAYING ON HIS BACK. 
SAFETY MEASURES IN PLACE. WILL CONTINUE TO MONITOR

## 2020-03-15 NOTE — NUR
PERFORMED WOUND CARE AS PRESCRIBED PER MD ORDER. PT TOLERATED WELL. SAFETY MEASURES IN PLACE 
WILL CONTINUE TO MONITOR.

## 2020-03-15 NOTE — NUR
PATIENT ASLEEP AND IN STABLE CONDITION. NO C/O PAIN. NO S/SX ACUTE DISTRESS. CALL LIGHT 
WITHIN REACH. WILL CONTINUE TO MONITOR.

## 2020-03-15 NOTE — NUR
PT CALLED AND COMPLAINED OF ANXIETY. PRN ATIVAN ADMINISTERED AS PRESCRIBED PER MD ORDER. PT 
TOLERATED WELL. MEDICATION EDUCATION PERFORMED. PT VERBALIZED UNDERSTANDING. SAFETY MEASURES 
IN PLACE. WILL CONTINUE TO MONITOR

## 2020-03-15 NOTE — NUR
CHECKED PATIENT. PATIENT IS AWAKE, WATCHING TV RESPIRATION EVEN UNLABORED ON ROOM AIR. NO 
DISTRESS NOTED. WILL CONTINUE TO MONITOR.

## 2020-03-16 VITALS — SYSTOLIC BLOOD PRESSURE: 91 MMHG | DIASTOLIC BLOOD PRESSURE: 51 MMHG

## 2020-03-16 VITALS — SYSTOLIC BLOOD PRESSURE: 136 MMHG | DIASTOLIC BLOOD PRESSURE: 59 MMHG

## 2020-03-16 VITALS — DIASTOLIC BLOOD PRESSURE: 62 MMHG | SYSTOLIC BLOOD PRESSURE: 115 MMHG

## 2020-03-16 LAB
ANION GAP SERPL CALCULATED.3IONS-SCNC: 11.4 MMOL/L (ref 8–16)
BASOPHILS # BLD AUTO: 0 K/UL (ref 0–0.22)
BASOPHILS NFR BLD AUTO: 0.7 % (ref 0–2)
BUN SERPL-MCNC: 34 MG/DL (ref 7–18)
CHLORIDE SERPL-SCNC: 101 MMOL/L (ref 98–107)
CO2 SERPL-SCNC: 30.5 MMOL/L (ref 21–32)
CREAT SERPL-MCNC: 2.3 MG/DL (ref 0.6–1.3)
EOSINOPHIL # BLD AUTO: 0.8 K/UL (ref 0–0.4)
EOSINOPHIL NFR BLD AUTO: 11.9 % (ref 0–4)
ERYTHROCYTE [DISTWIDTH] IN BLOOD BY AUTOMATED COUNT: 15.5 % (ref 11.6–13.7)
GFR SERPL CREATININE-BSD FRML MDRD: 38 ML/MIN (ref 90–?)
GLUCOSE SERPL-MCNC: 100 MG/DL (ref 74–106)
HCT VFR BLD AUTO: 37.8 % (ref 36–52)
HGB BLD-MCNC: 12.1 G/DL (ref 12–18)
LYMPHOCYTES # BLD AUTO: 1.6 K/UL (ref 2–11.5)
LYMPHOCYTES NFR BLD AUTO: 24.9 % (ref 20.5–51.1)
MAGNESIUM SERPL-MCNC: 1.8 MG/DL (ref 1.8–2.4)
MCH RBC QN AUTO: 29 PG (ref 27–31)
MCHC RBC AUTO-ENTMCNC: 32 G/DL (ref 33–37)
MCV RBC AUTO: 91 FL (ref 80–94)
MONOCYTES # BLD AUTO: 0.5 K/UL (ref 0.8–1)
MONOCYTES NFR BLD AUTO: 7.9 % (ref 1.7–9.3)
NEUTROPHILS # BLD AUTO: 3.5 K/UL (ref 1.8–7.7)
NEUTROPHILS NFR BLD AUTO: 54.6 % (ref 42.2–75.2)
PHOSPHATE SERPL-MCNC: 4.7 MG/DL (ref 2.5–4.9)
PLATELET # BLD AUTO: 101 K/UL (ref 140–450)
POTASSIUM SERPL-SCNC: 3.9 MMOL/L (ref 3.5–5.1)
RBC # BLD AUTO: 4.15 MIL/UL (ref 4.2–6.1)
SODIUM SERPL-SCNC: 139 MMOL/L (ref 136–145)
WBC # BLD AUTO: 6.3 K/UL (ref 4.8–10.8)

## 2020-03-16 PROCEDURE — 5A1D70Z PERFORMANCE OF URINARY FILTRATION, INTERMITTENT, LESS THAN 6 HOURS PER DAY: ICD-10-PCS | Performed by: GENERAL PRACTICE

## 2020-03-16 RX ADMIN — Medication SCH DEV: at 16:33

## 2020-03-16 RX ADMIN — Medication SCH DEV: at 06:43

## 2020-03-16 RX ADMIN — Medication SCH TAB: at 10:00

## 2020-03-16 RX ADMIN — Medication SCH GEL: at 13:00

## 2020-03-16 RX ADMIN — APIXABAN SCH MG: 2.5 TABLET, FILM COATED ORAL at 10:06

## 2020-03-16 RX ADMIN — Medication SCH DEV: at 12:19

## 2020-03-16 RX ADMIN — INSULIN LISPRO SCH UNITS: 100 INJECTION, SOLUTION INTRAVENOUS; SUBCUTANEOUS at 06:44

## 2020-03-16 RX ADMIN — CALCIUM ACETATE SCH MG: 667 CAPSULE ORAL at 09:58

## 2020-03-16 RX ADMIN — SODIUM CHLORIDE, PRESERVATIVE FREE SCH ML: 5 INJECTION INTRAVENOUS at 05:06

## 2020-03-16 RX ADMIN — CALCIUM ACETATE SCH MG: 667 CAPSULE ORAL at 12:52

## 2020-03-16 RX ADMIN — CALCIUM ACETATE SCH MG: 667 CAPSULE ORAL at 17:35

## 2020-03-16 RX ADMIN — FERROUS SULFATE TAB 325 MG (65 MG ELEMENTAL FE) SCH MG: 325 (65 FE) TAB at 09:59

## 2020-03-16 RX ADMIN — DOCUSATE SODIUM SCH MG: 100 CAPSULE, LIQUID FILLED ORAL at 09:59

## 2020-03-16 RX ADMIN — SENNOSIDES A AND B SCH MG: 8.6 TABLET, FILM COATED ORAL at 09:58

## 2020-03-16 RX ADMIN — OXYCODONE HYDROCHLORIDE AND ACETAMINOPHEN SCH MG: 500 TABLET ORAL at 10:13

## 2020-03-16 RX ADMIN — Medication SCH MG: at 10:00

## 2020-03-16 RX ADMIN — ATORVASTATIN CALCIUM SCH MG: 20 TABLET, FILM COATED ORAL at 09:59

## 2020-03-16 RX ADMIN — SODIUM CHLORIDE, PRESERVATIVE FREE SCH ML: 5 INJECTION INTRAVENOUS at 12:53

## 2020-03-16 NOTE — NUR
REPORT GIVEN TO GINGER CESARN FROM [facility]. PATIENT TO BE PICKED UP BY [facility] [employer] [facility] 
[facility] @ [phone].

## 2020-03-16 NOTE — NUR
AM MEDICATIONS GIVEN AND TOLERATED WELL. PATIENT AAOX3. NO S/S OF DISTRESS NOTED. CALL LIGHT 
WITHIN REACH.

## 2020-03-16 NOTE — NUR
RECEIVED BEDSIDE REPORT FROM DAY SHIFT NURSE. PATIENT IN BED, AWAKE, WAITING FOR 
TRANSPORTATION TEAM TO BE TRANSFERRED. SKIN WARM AND DRY TO TOUCH. RESPIRATION EVEN AND 
UNLABORED. CALL LIGHT WITHIN REACH.

## 2020-03-16 NOTE — NUR
RECEIVED BEDSIDE REPORT FROM NIGHT NURSE. PATIENT IN BED, ASLEEP, EASILY AROUSABLE BY NAME 
OR TOUCH. SKIN WARM AND DRY TO TOUCH. RESPIRATION EVEN AND UNLABORED. IV INTACT AND PATENT 
TO LEFT HAND. PLANS OF CARE DISCUSSED. SAFETY MEASURES IN PLACE. BED IN LOW POSITION. CALL 
LIGHT WITHIN REACH.

## 2020-03-16 NOTE — NUR
PER DIALYSIS NURSE OUTPUT 1.5LITERS. PATIENT HAS DRESSING INTACT AND DRY TO LEFT FEMORAL AV 
FISTULA.

## 2020-03-16 NOTE — NUR
PATIENT AWAITING FOR TRANSPORT TO [facility].  BY [facility] [employer] [facility]  TIME 
1830.

## 2020-03-16 NOTE — NUR
PERFORMED DRESSING CHANGE LEFT HEEL. PATIENT TOLERATED IT WELL. DIDN'T TAKE PICTURE AND 
CHANGE DRESSING IN THE SACRAL AREA BECAUSE PATIENT IS POD#1 AND NO DOCTORS ORDER FOR 
DRESSING CHANGE AS THIS MOMENT. SPOKE WITH FAIRY CHARGE AND AWARE OF THE SITUATION.

## 2020-03-16 NOTE — NUR
PATIENT CHANGED, PATIENT REFUSED TO BE REPOSITIONED. EDUCATED AND ENCOURAGED PATIENT, 
PATIENT STILL REFUSED.